# Patient Record
Sex: FEMALE | Race: WHITE | Employment: OTHER | URBAN - METROPOLITAN AREA
[De-identification: names, ages, dates, MRNs, and addresses within clinical notes are randomized per-mention and may not be internally consistent; named-entity substitution may affect disease eponyms.]

---

## 2017-11-24 ENCOUNTER — OFFICE VISIT (OUTPATIENT)
Dept: OBGYN CLINIC | Age: 77
End: 2017-11-24

## 2017-11-24 VITALS
DIASTOLIC BLOOD PRESSURE: 72 MMHG | HEIGHT: 65 IN | BODY MASS INDEX: 37.49 KG/M2 | SYSTOLIC BLOOD PRESSURE: 130 MMHG | WEIGHT: 225 LBS

## 2017-11-24 DIAGNOSIS — Z01.419 ENCOUNTER FOR GYNECOLOGICAL EXAMINATION WITHOUT ABNORMAL FINDING: Primary | ICD-10-CM

## 2017-11-24 RX ORDER — METHYLPREDNISOLONE 4 MG/1
TABLET ORAL
COMMUNITY
Start: 2017-11-21 | End: 2018-09-17 | Stop reason: CLARIF

## 2017-11-24 RX ORDER — APIXABAN 5 MG/1
5 TABLET, FILM COATED ORAL 2 TIMES DAILY
COMMUNITY
Start: 2017-11-07

## 2017-11-24 NOTE — PATIENT INSTRUCTIONS

## 2017-11-24 NOTE — PROGRESS NOTES
Annual exam ages 69+ post hysterectomy    Freddy Burns is a ,  69 y.o. female [facility] whose No LMP recorded. Patient has had a hysterectomy. She presents for her annual checkup. She is having no significant problems. Hormonal status:  She is not having vasomotor symptoms. The patient is using ERT. Sexual history:     She reports that she currently engages in sexual activity and has had male partners. She reports using the following method of birth control/protection: Surgical.    Medical conditions:    Since her last annual GYN exam about two years ago, she has not the following changes in her health history: none. Pap and Mammogram History:    Her most recent Pap smear wasnormal obtained 7 year(s) ago. The patient had her mammogram today in our office. Breast Cancer History/Substance Abuse: There is not  a history of breast cancer in her family. Tobacco History:  reports that she quit smoking about 29 years ago. She has a 30.00 pack-year smoking history. She has never used smokeless tobacco.  Alcohol Abuse:  reports that she does not drink alcohol. Drug Abuse:  reports that she does not use illicit drugs. Osteoporosis History:    Family history does not include a first or second degree relative with osteopenia or osteoporosis. A bone density scan was obtained 5yrs ago and revealed WNL. She is currently taking calcium and vit D. Past Medical History:   Diagnosis Date    Allergic rhinitis due to other allergen     Anxiety and depression     Arrhythmia     \"palpitations\"-onset within past year-ATRIAL FIB STATES PATIENT    Arthritis     all joints, and states she needs left hip replaced    Atrial fibrillation (HCC)     Dysthymic disorder     Dysthymic disorder     Esophageal abrasion 2014    Pt states she had 4 units of blood.     Essential hypertension, benign     H/O: hysterectomy     Hernia     Hx of bone density study 11    wnl  spine (1.7)  hip(1.0)    Hypertension     Mixed hyperlipidemia     Mixed hyperlipidemia     Thyroid disease     hypothyroid    Unspecified hypothyroidism      Past Surgical History:   Procedure Laterality Date    HX APPENDECTOMY      HX BREAST BIOPSY  11/6/2013    LEFT papilloma    HX BREAST BIOPSY Left 05/03/16    Fat necrosis. 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  HX HEENT      tooth implants    HX HERNIA REPAIR  2008    HX HIP REPLACEMENT  2/18/46    complicated by A fib post op    HX HIP REPLACEMENT  01/22/14    left    HX OTHER SURGICAL  07/2014    Heart Surgery for AFIB    HX TOTAL ABDOMINAL HYSTERECTOMY  1976    HX UROLOGICAL  2007    bladder tuck--colposacral susp- u-v suspension    LAP UMBILICAL HERNIA REPAIR  2009    Dr. Pedro Luis Small     Current Outpatient Prescriptions   Medication Sig Dispense Refill    methylPREDNISolone (MEDROL DOSEPACK) 4 mg tablet       ELIQUIS 5 mg tablet       TIKOSYN 250 mcg capsule       lisinopril (PRINIVIL, ZESTRIL) 20 mg tablet       zolpidem (AMBIEN) 5 mg tablet       estradiol (CLIMARA) 0.05 mg/24 hr 1 Patch by TransDERmal route Every Saturday. 12 Patch 4    sertraline (ZOLOFT) 50 mg tablet       traMADol (ULTRAM) 50 mg tablet Take 1 Tab by mouth every eight (8) hours as needed for Pain. 30 Tab 0    levothyroxine (SYNTHROID) 100 mcg tablet Take 1 Tab by mouth Daily (before breakfast). 90 Tab 3    ergocalciferol (VITAMIN D2) 50,000 unit capsule Take 1 Cap by mouth two (2) times a week. 9 Cap 5    LORazepam (ATIVAN) 0.5 mg tablet Take 1 Tab by mouth two (2) times daily as needed for Anxiety. 180 Tab 1    buPROPion XL (WELLBUTRIN XL) 300 mg XL tablet Take 1 Tab by mouth every morning. 90 Tab 3    azelastine (ASTELIN) 137 mcg nasal spray 1 [place] by Both Nostrils route two (2) times a day. Administer to right and left nostril. 3 Bottle 2    metoprolol succinate (TOPROL-XL) 25 mg XL tablet Take 2 Tabs by mouth daily.  90 Tab 3    omeprazole (PRILOSEC) 20 mg capsule       diltiazem hcl (CARDIZEM) 120 mg tablet Take 120 mg by mouth once. Allergies: Review of patient's allergies indicates no known allergies. Social History     Social History    Marital status:      Spouse name: N/A    Number of children: N/A    Years of education: N/A     Occupational History    Not on file.      Social History Main Topics    Smoking status: Former Smoker     Packs/day: 1.00     Years: 30.00     Quit date: 11/4/1988    Smokeless tobacco: Never Used    Alcohol use No    Drug use: No    Sexual activity: Yes     Partners: Male     Birth control/ protection: Surgical      Comment: Hysterectomy     Other Topics Concern    Not on file     Social History Narrative     Patient Active Problem List   Diagnosis Code    Essential hypertension, benign I10    Allergic rhinitis due to other allergen J30.89    Dysthymic disorder F34.1    Unspecified hypothyroidism E03.9    Osteoarthrosis, unspecified whether generalized or localized, unspecified site M19.90    Mixed hyperlipidemia E78.2    Restless legs syndrome (RLS) G25.81    DJD (degenerative joint disease) of hip M16.9    Atrial fibrillation (HCC) I48.91    Urinary incontinence in female R32    Symptomatic menopausal or female climacteric states N95.1       Review of Systems - History obtained from the patient  Constitutional: negative for weight loss, fever, night sweats  HEENT: negative for hearing loss, earache, congestion, snoring, sorethroat  CV: negative for chest pain, palpitations, edema  Resp: negative for cough, shortness of breath, wheezing  GI: negative for change in bowel habits, abdominal pain, black or bloody stools  : negative for frequency, dysuria, hematuria, vaginal discharge  MSK: negative for back pain, joint pain, muscle pain  Breast: negative for breast lumps, nipple discharge, galactorrhea  Skin :negative for itching, rash, hives  Neuro: negative for dizziness, headache, confusion, weakness  Psych: negative for anxiety, depression, change in mood  Heme/lymph: negative for bleeding, bruising, pallor    Physical Exam    Visit Vitals    /72    Ht 5' 5\" (1.651 m)    Wt 225 lb (102.1 kg)    BMI 37.44 kg/m2     Constitutional  · Appearance: well-nourished, well developed, alert, in no acute distress    HENT  · Head and Face: appears normal    Neck  · Inspection/Palpation: normal appearance, no masses or tenderness  · Lymph Nodes: no lymphadenopathy present  · Thyroid: gland size normal, nontender, no nodules or masses present on palpation    Chest  · Respiratory Effort: breathing labored  · Auscultation: normal breath sounds    Cardiovascular  · Heart:  · Auscultation: regular rate and rhythm without murmur    Breasts  · Inspection of Breasts: breasts symmetrical, no skin changes, no discharge present, nipple appearance normal, no skin retraction present  · Palpation of Breasts and Axillae: no masses present on palpation, no breast tenderness  · Axillary Lymph Nodes: no lymphadenopathy present    Gastrointestinal  · Abdominal Examination: abdomen non-tender to palpation, normal bowel sounds, no masses present  · Liver and spleen: no hepatomegaly present, spleen not palpable  · Hernias: no hernias identified    Skin  · General Inspection: no rash, no lesions identified    Neurologic/Psychiatric  · Mental Status:  · Orientation: grossly oriented to person, place and time  · Mood and Affect: mood normal, affect appropriate    Genitourinary  · External Genitalia: normal appearance for age, no discharge present, no tenderness present, no inflammatory lesions present, no masses present, atrophy present  · Vagina: atrophic but otherwise normal vaginal vault without central or paravaginal defects, no discharge present, no inflammatory lesions present, no masses present  · Bladder: non-tender to palpation  · Urethra: appears normal  · Cervix: absent   · Uterus: absent  · Adnexa: no adnexal tenderness present, no adnexal masses present  · Perineum: perineum within normal limits, no evidence of trauma, no rashes or skin lesions present  · Anus: anus within normal limits, no hemorrhoids present  · Inguinal Lymph Nodes: no lymphadenopathy present    Assessment:  Routine gynecologic examination  Her current medical status is satisfactory with no evidence of significant gynecologic issues.     Plan:  Counseled re: diet, exercise, healthy lifestyle  Return for yearly wellness visits  Rec annual mammogram

## 2017-11-29 ENCOUNTER — TELEPHONE (OUTPATIENT)
Dept: OBGYN CLINIC | Age: 77
End: 2017-11-29

## 2017-11-29 NOTE — TELEPHONE ENCOUNTER
Patient is calling to see if she can get mammography results from Dr. Boy Stone that was performed on 11/24/17. \    Left message for patient to call me with     (Results are normal, repeat in 1 year).

## 2018-09-18 ENCOUNTER — HOSPITAL ENCOUNTER (OUTPATIENT)
Age: 78
Setting detail: OUTPATIENT SURGERY
Discharge: HOME | End: 2018-09-18
Attending: SPECIALIST | Admitting: SPECIALIST
Payer: MEDICARE

## 2018-09-18 ENCOUNTER — ANESTHESIA EVENT (OUTPATIENT)
Dept: ENDOSCOPY | Age: 78
End: 2018-09-18
Payer: MEDICARE

## 2018-09-18 ENCOUNTER — ANESTHESIA (OUTPATIENT)
Dept: ENDOSCOPY | Age: 78
End: 2018-09-18
Payer: MEDICARE

## 2018-09-18 VITALS
TEMPERATURE: 97.9 F | BODY MASS INDEX: 37.49 KG/M2 | RESPIRATION RATE: 19 BRPM | HEIGHT: 65 IN | SYSTOLIC BLOOD PRESSURE: 126 MMHG | HEART RATE: 50 BPM | WEIGHT: 225 LBS | DIASTOLIC BLOOD PRESSURE: 62 MMHG | OXYGEN SATURATION: 98 %

## 2018-09-18 PROCEDURE — 76060000031 HC ANESTHESIA FIRST 0.5 HR: Performed by: SPECIALIST

## 2018-09-18 PROCEDURE — 74011250636 HC RX REV CODE- 250/636

## 2018-09-18 PROCEDURE — 74011250636 HC RX REV CODE- 250/636: Performed by: PHYSICIAN ASSISTANT

## 2018-09-18 PROCEDURE — 76040000019: Performed by: SPECIALIST

## 2018-09-18 RX ORDER — NALOXONE HYDROCHLORIDE 0.4 MG/ML
0.4 INJECTION, SOLUTION INTRAMUSCULAR; INTRAVENOUS; SUBCUTANEOUS
Status: DISCONTINUED | OUTPATIENT
Start: 2018-09-18 | End: 2018-09-18 | Stop reason: HOSPADM

## 2018-09-18 RX ORDER — LIDOCAINE HYDROCHLORIDE 20 MG/ML
INJECTION, SOLUTION EPIDURAL; INFILTRATION; INTRACAUDAL; PERINEURAL AS NEEDED
Status: DISCONTINUED | OUTPATIENT
Start: 2018-09-18 | End: 2018-09-18 | Stop reason: HOSPADM

## 2018-09-18 RX ORDER — LOSARTAN POTASSIUM 100 MG/1
100 TABLET ORAL DAILY
COMMUNITY

## 2018-09-18 RX ORDER — PROPOFOL 10 MG/ML
INJECTION, EMULSION INTRAVENOUS AS NEEDED
Status: DISCONTINUED | OUTPATIENT
Start: 2018-09-18 | End: 2018-09-18 | Stop reason: HOSPADM

## 2018-09-18 RX ORDER — FENTANYL CITRATE 50 UG/ML
25 INJECTION, SOLUTION INTRAMUSCULAR; INTRAVENOUS AS NEEDED
Status: DISCONTINUED | OUTPATIENT
Start: 2018-09-18 | End: 2018-09-18 | Stop reason: HOSPADM

## 2018-09-18 RX ORDER — PANTOPRAZOLE SODIUM 20 MG/1
20 TABLET, DELAYED RELEASE ORAL
Qty: 60 TAB | Refills: 1 | Status: ON HOLD | OUTPATIENT
Start: 2018-09-18 | End: 2018-11-28

## 2018-09-18 RX ORDER — DEXTROMETHORPHAN/PSEUDOEPHED 2.5-7.5/.8
1.2 DROPS ORAL
Status: DISCONTINUED | OUTPATIENT
Start: 2018-09-18 | End: 2018-09-18 | Stop reason: HOSPADM

## 2018-09-18 RX ORDER — SODIUM CHLORIDE 9 MG/ML
50 INJECTION, SOLUTION INTRAVENOUS CONTINUOUS
Status: DISCONTINUED | OUTPATIENT
Start: 2018-09-18 | End: 2018-09-18 | Stop reason: HOSPADM

## 2018-09-18 RX ORDER — FLUMAZENIL 0.1 MG/ML
0.2 INJECTION INTRAVENOUS
Status: DISCONTINUED | OUTPATIENT
Start: 2018-09-18 | End: 2018-09-18 | Stop reason: HOSPADM

## 2018-09-18 RX ORDER — EPINEPHRINE 0.1 MG/ML
1 INJECTION INTRACARDIAC; INTRAVENOUS
Status: DISCONTINUED | OUTPATIENT
Start: 2018-09-18 | End: 2018-09-18 | Stop reason: HOSPADM

## 2018-09-18 RX ORDER — MIDAZOLAM HYDROCHLORIDE 1 MG/ML
.25-5 INJECTION, SOLUTION INTRAMUSCULAR; INTRAVENOUS AS NEEDED
Status: DISCONTINUED | OUTPATIENT
Start: 2018-09-18 | End: 2018-09-18 | Stop reason: HOSPADM

## 2018-09-18 RX ORDER — ATROPINE SULFATE 0.1 MG/ML
0.5 INJECTION INTRAVENOUS
Status: DISCONTINUED | OUTPATIENT
Start: 2018-09-18 | End: 2018-09-18 | Stop reason: HOSPADM

## 2018-09-18 RX ADMIN — PROPOFOL 20 MG: 10 INJECTION, EMULSION INTRAVENOUS at 07:17

## 2018-09-18 RX ADMIN — SODIUM CHLORIDE 50 ML/HR: 900 INJECTION, SOLUTION INTRAVENOUS at 06:36

## 2018-09-18 RX ADMIN — LIDOCAINE HYDROCHLORIDE 80 MG: 20 INJECTION, SOLUTION EPIDURAL; INFILTRATION; INTRACAUDAL; PERINEURAL at 07:16

## 2018-09-18 RX ADMIN — PROPOFOL 20 MG: 10 INJECTION, EMULSION INTRAVENOUS at 07:18

## 2018-09-18 RX ADMIN — PROPOFOL 60 MG: 10 INJECTION, EMULSION INTRAVENOUS at 07:16

## 2018-09-18 NOTE — ANESTHESIA POSTPROCEDURE EVALUATION
Post-Anesthesia Evaluation and Assessment Patient: Blossom Beltrán MRN: [id number]  SSN: [id number] YOB: 1940  Age: 69 y.o. Sex: female Cardiovascular Function/Vital Signs Visit Vitals  /59  Pulse (!) 54  Temp 36.6 °C (97.9 °F)  Resp 13  Ht 5' 5\" (1.651 m)  Wt 102.1 kg (225 lb)  SpO2 96%  Breastfeeding No  
 BMI 37.44 kg/m2 Patient is status post MAC anesthesia for Procedure(s): ESOPHAGOGASTRODUODENOSCOPY (EGD). Nausea/Vomiting: None Postoperative hydration reviewed and adequate. Pain: 
Pain Scale 1: Numeric (0 - 10) (09/18/18 9746) Pain Intensity 1: 0 (09/18/18 8745) Managed Neurological Status: At baseline Mental Status and Level of Consciousness: Arousable Pulmonary Status:  
O2 Device: Room air (09/18/18 1563) Adequate oxygenation and airway patent Complications related to anesthesia: None Post-anesthesia assessment completed. No concerns Signed By: Raisa Stephens MD   
 September 18, 2018

## 2018-09-18 NOTE — ANESTHESIA PREPROCEDURE EVALUATION
Anesthetic History No history of anesthetic complications Review of Systems / Medical History Patient summary reviewed, nursing notes reviewed and pertinent labs reviewed Pulmonary Within defined limits Neuro/Psych Within defined limits Cardiovascular Hypertension Dysrhythmias : atrial fibrillation Exercise tolerance: >4 METS Comments: metoprolol succinate (TOPROL-XL) 25 mg XL tablet 9/18/2018 at 0400 GI/Hepatic/Renal 
Within defined limits Endo/Other Hypothyroidism Arthritis Other Findings Physical Exam 
 
Airway Mallampati: II 
TM Distance: 4 - 6 cm Neck ROM: normal range of motion Mouth opening: Normal 
 
 Cardiovascular Regular rate and rhythm,  S1 and S2 normal,  no murmur, click, rub, or gallop Rhythm: regular Rate: normal 
 
 
 
 Dental 
 
Dentition: Implants Pulmonary Breath sounds clear to auscultation Abdominal 
GI exam deferred Other Findings Anesthetic Plan ASA: 3 Anesthesia type: MAC Anesthetic plan and risks discussed with: Patient

## 2018-09-18 NOTE — PROCEDURES
[address]  [phone]      2018    Esophagogastroduodenoscopy (EGD) Procedure Note  Sandee Heath  : 1940  [employer] Medical Record Number: [id number]      Indications:    History of peptic ulcer to assess for healing. Referring Physician:  Emmanuel Morales MD  Anesthesia/Sedation:    Endoscopist:  Dr. Daniel Hill  Complications:  None  Estimated Blood Loss:  None    Permit:  The indications, risks, benefits and alternatives were reviewed with the patient or their decision maker who was provided an opportunity to ask questions and all questions were answered. The specific risks of esophagogastroduodenoscopy with conscious sedation were reviewed, including but not limited to anesthetic complication, bleeding, adverse drug reaction, missed lesion, infection, IV site reactions, and intestinal perforation which would lead to the need for surgical repair. Alternatives to EGD including radiographic imaging, observation without testing, or laboratory testing were reviewed as well as the limitations of those alternatives discussed. After considering the options and having all their questions answered, the patient or their decision maker provided both verbal and written consent to proceed. Procedure in Detail:  After obtaining informed consent, positioning of the patient in the left lateral decubitus position, and conduction of a pre-procedure pause or \"time out\" the endoscope was introduced into the mouth and advanced to the duodenum. A careful inspection was made, and findings or interventions are described below. Findings:   Esophagus:normal  Stomach: Two small excavated ulcers of the antrum - non bleeding. Duodenum/jejunum: normal      Specimens: none -- patient anticoagulated. Impression: PUD.            Recommendations:  -Acid suppression with a proton pump inhibitor. , -No NSAIDS, -H pylori test will be obtained and eradicated if positive  Repeat EGD in 8 weeks to assess for healing (off eliquis). Thank you for entrusting me with this patient's care. Please do not hesitate to contact me with any questions or if I can be of assistance with any of your other patients' GI needs.   Signed By: Sabrina Andrews MD                        September 18, 2018     Surgical assistant none

## 2018-09-18 NOTE — INTERVAL H&P NOTE
Pre-Endoscopy H&P Update Chief complaint/HPI/ROS:  The indication for the procedure, the patient's history and the patient's current medications are reviewed prior to the procedure and that data is reported on the H&P to which this document is attached. Any significant complaints with regard to organ systems will be noted, and if not mentioned then a review of systems is not contributory. Past Medical History:  
Diagnosis Date  Allergic rhinitis due to other allergen  Anxiety and depression  Arrhythmia \"palpitations\"-onset within past year-ATRIAL FIB STATES PATIENT  Arthritis   
 all joints, and states she needs left hip replaced  Atrial fibrillation ([facility] [address]  Dysthymic disorder  Dysthymic disorder  Esophageal abrasion 04/2014 Pt states she had 4 units of blood.  Essential hypertension, benign  H/O: hysterectomy  Hernia  Hx of bone density study 04/29/11  
 wnl  spine (1.7)  hip(1.0)  Hypertension  Mixed hyperlipidemia  Mixed hyperlipidemia  Thyroid disease   
 hypothyroid  Unspecified hypothyroidism Past Surgical History:  
Procedure Laterality Date  CARDIAC SURG PROCEDURE UNLIST  2014  
 ablation / Dr. Den Farfan  HX APPENDECTOMY  HX BREAST BIOPSY  11/6/2013 LEFT papilloma  HX BREAST BIOPSY Left 05/03/16 Fat necrosis. 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  HX HEENT    
 tooth implants  HX HERNIA REPAIR  3386  
 umbilical hernia Dr Osmin Vaughan  HX HIP REPLACEMENT  1/15/33  
 complicated by A fib post op  HX HIP REPLACEMENT  01/22/14  
 left  HX HYSTERECTOMY  HX ORTHOPAEDIC  2014  
 left hip replacement  HX OTHER SURGICAL  07/2014 Heart Surgery for AFIB [facility]  HX UROLOGICAL  2007  
 bladder tuck--colposacral susp- u-v suspension  LAP UMBILICAL HERNIA REPAIR  2009 Dr. Wally Reyes Social  
Social History Substance Use Topics  Smoking status: Former Smoker Packs/day: 1.00 Years: 30.00 Quit date: 1988  Smokeless tobacco: Never Used  Alcohol use No  
  
Family History Problem Relation Age of Onset  Hypertension Mother  Heart Disease Mother  Hypertension Brother  Cancer Father   
  prostate  Cancer Sister   
  breast  
 Cancer Maternal Aunt   
  breast  
 Anxiety Other  Allergic Rhinitis Other  Arthritis-osteo Other  Depression Other  Headache Other  Thyroid Disease Other  Cancer Other 52  
  breast  
 Cancer Other   
  breast  
 Cancer Other   
  breast/breast  
 Anesth Problems Neg Hx No Known Allergies Prior to Admission Medications Prescriptions Last Dose Informant Patient Reported? Taking? ELIQUIS 5 mg tablet 2018 at 0400  Yes Yes LORazepam (ATIVAN) 0.5 mg tablet 2018 at pm  No Yes Sig: Take 1 Tab by mouth two (2) times daily as needed for Anxiety. TIKOSYN 250 mcg capsule 2018 at 0400  Yes Yes  
buPROPion XL (WELLBUTRIN XL) 300 mg XL tablet 2018 at 0400  No Yes Sig: Take 1 Tab by mouth every morning. estradiol (CLIMARA) 0.05 mg/24 hr 9/15/2018 at am  No No  
Si Patch by TransDERmal route Every Saturday. levothyroxine (SYNTHROID) 100 mcg tablet 2018 at 0400  No Yes Sig: Take 1 Tab by mouth Daily (before breakfast). losartan (COZAAR) 100 mg tablet 2018 at 0400  Yes Yes Sig: Take 100 mg by mouth daily. metoprolol succinate (TOPROL-XL) 25 mg XL tablet 2018 at 0400  No Yes Sig: Take 2 Tabs by mouth daily. traMADol (ULTRAM) 50 mg tablet 2018 at pm  No No  
Sig: Take 1 Tab by mouth every eight (8) hours as needed for Pain. Facility-Administered Medications: None PHYSICAL EXAM:  The patient is examined immediately prior to the procedure. Visit Vitals  /64  Pulse (!) 51  Temp 97.9 °F (36.6 °C)  Resp 13  Ht 5' 5\" (1.651 m)  Wt 102.1 kg (225 lb)  SpO2 97%  Breastfeeding No  
 BMI 37.44 kg/m2 Gen: Appears comfortable, no distress. Pulm: comfortable respirations with no abnormal audible breath sounds HEART: well perfused, no abnormal audible heart sounds GI: abdomen flat. PLAN:  Informed consent discussion held, patient afforded an opportunity to ask questions and all questions answered. After being advised of the risks, benefits, and alternatives, the patient requested that we proceed and indicated so on a written consent form. Will proceed with procedure as planned.  
Dean Trivedi MD

## 2018-09-18 NOTE — IP AVS SNAPSHOT
Shilpi Jeffery 
 
 
 [address] [place], [place] [id number] Patient: Lucio Le MRN: [id number] About your hospitalization You were admitted on:  September 18, 2018 You last received care in the:  [facility] ENDOSCOPY You were discharged on:  September 18, 2018 Why you were hospitalized Your primary diagnosis was:  Not on File Follow-up Information None Discharge Orders None A check rbanden indicates which time of day the medication should be taken. My Medications START taking these medications Instructions Each Dose to Equal  
 Morning Noon Evening Bedtime  
 pantoprazole 20 mg tablet Commonly known as:  PROTONIX Your last dose was: Your next dose is: Take 1 Tab by mouth Daily (before breakfast). 20 mg CONTINUE taking these medications Instructions Each Dose to Equal  
 Morning Noon Evening Bedtime buPROPion  mg XL tablet Commonly known as:  Moy Merlos Your last dose was: Your next dose is: Take 1 Tab by mouth every morning. 300 mg  
    
   
   
   
  
 ELIQUIS 5 mg tablet Generic drug:  apixaban Your last dose was: Your next dose is:    
   
   
      
   
   
   
  
 estradiol 0.05 mg/24 hr  
Commonly known as:  Christie Westfield Your last dose was: Your next dose is:    
   
   
 1 Patch by TransDERmal route Every Saturday. 1 Patch  
    
   
   
   
  
 levothyroxine 100 mcg tablet Commonly known as:  SYNTHROID Your last dose was: Your next dose is: Take 1 Tab by mouth Daily (before breakfast). 100 mcg LORazepam 0.5 mg tablet Commonly known as:  ATIVAN Your last dose was: Your next dose is: Take 1 Tab by mouth two (2) times daily as needed for Anxiety.   
 0.5 mg  
    
   
   
 losartan 100 mg tablet Commonly known as:  COZAAR Your last dose was: Your next dose is: Take 100 mg by mouth daily. 100 mg  
    
   
   
   
  
 metoprolol succinate 25 mg XL tablet Commonly known as:  TOPROL-XL Your last dose was: Your next dose is: Take 2 Tabs by mouth daily. 50 mg  
    
   
   
   
  
 TIKOSYN 250 mcg capsule Generic drug:  dofetilide Your last dose was: Your next dose is:    
   
   
      
   
   
   
  
 traMADol 50 mg tablet Commonly known as:  ULTRAM  
   
Your last dose was: Your next dose is: Take 1 Tab by mouth every eight (8) hours as needed for Pain. 50 mg Where to Get Your Medications Information on where to get these meds will be given to you by the nurse or doctor. ! Ask your nurse or doctor about these medications  
  pantoprazole 20 mg tablet Opioid Education Prescription Opioids: What You Need to Know: 
 
Prescription opioids can be used to help relieve moderate-to-severe pain and are often prescribed following a surgery or injury, or for certain health conditions. These medications can be an important part of treatment but also come with serious risks. Opioids are strong pain medicines. Examples include hydrocodone, oxycodone, fentanyl, and morphine. Heroin is an example of an illegal opioid. It is important to work with your health care provider to make sure you are getting the safest, most effective care. WHAT ARE THE RISKS AND SIDE EFFECTS OF OPIOID USE? Prescription opioids carry serious risks of addiction and overdose, especially with prolonged use. An opioid overdose, often marked by slow breathing, can cause sudden death. The use of prescription opioids can have a number of side effects as well, even when taken as directed. · Tolerance-meaning you might need to take more of a medication for the same pain relief · Physical dependence-meaning you have symptoms of withdrawal when the medication is stopped. Withdrawal symptoms can include nausea, sweating, chills, diarrhea, stomach cramps, and muscle aches. Withdrawal can last up to several weeks, depending on which drug you took and how long you took it. · Increased sensitivity to pain · Constipation · Nausea, vomiting, and dry mouth · Sleepiness and dizziness · Confusion · Depression · Low levels of testosterone that can result in lower sex drive, energy, and strength · Itching and sweating RISKS ARE GREATER WITH:      
· History of drug misuse, substance use disorder, or overdose · Mental health conditions (such as depression or anxiety) · Sleep apnea · Older age (72 years or older) · Pregnancy Avoid alcohol while taking prescription opioids. Also, unless specifically advised by your health care provider, medications to avoid include: · Benzodiazepines (such as Xanax or Valium) · Muscle relaxants (such as Soma or Flexeril) · Hypnotics (such as Ambien or Lunesta) · Other prescription opioids KNOW YOUR OPTIONS Talk to your health care provider about ways to manage your pain that don't involve prescription opioids. Some of these options may actually work better and have fewer risks and side effects. Options may include: 
· Pain relievers such as acetaminophen, ibuprofen, and naproxen · Some medications that are also used for depression or seizures · Physical therapy and exercise · Counseling to help patients learn how to cope better with triggers of pain and stress. · Application of heat or cold compress · Massage therapy · Relaxation techniques Be Informed Make sure you know the name of your medication, how much and how often to take it, and its potential risks & side effects.  
 
IF YOU ARE PRESCRIBED OPIOIDS FOR PAIN: 
 · Never take opioids in greater amounts or more often than prescribed. Remember the goal is not to be pain-free but to manage your pain at a tolerable level. · Follow up with your primary care provider to: · Work together to create a plan on how to manage your pain. · Talk about ways to help manage your pain that don't involve prescription opioids. · Talk about any and all concerns and side effects. · Help prevent misuse and abuse. · Never sell or share prescription opioids · Help prevent misuse and abuse. · Store prescription opioids in a secure place and out of reach of others (this may include visitors, children, friends, and family). · Safely dispose of unused/unwanted prescription opioids: Find your community drug take-back program or your pharmacy mail-back program, or flush them down the toilet, following guidance from the Food and Drug Administration (www.fda.gov/Drugs/ResourcesForYou). · Visit www.cdc.gov/drugoverdose to learn about the risks of opioid abuse and overdose. · If you believe you may be struggling with addiction, tell your health care provider and ask for guidance or call [address] [place] [address] at [phone]. Discharge Instructions [address] [facility] Berry Hernandez, [address] [place] [address] 
[phone] September 18, 2018 Guillermo Graves YOB: 1940 ENDOSCOPY DISCHARGE INSTRUCTIONS If there is redness at IV site you should apply warm compress to area. If redness or soreness persist contact Dr. Berry Hernandez' or your primary care doctor. Gaseous discomfort may develop, but walking, belching will help relieve this. You may not operate a vehicle for 12 hours You may not operate machinery or dangerous appliances for rest of today You may not drink alcoholic beverages for 12 hours Avoid making any critical decisions for 24 hours DIET: 
You may resume your normal diet, but some patients find that heavy or large meals may lead to indigestion or vomiting. I suggest a light meal as first food intake. MEDICATIONS: 
The use of some over-the-counter pain medication may lead to bleeding after biopsies or other procedures you may have had done. Tylenol (also called acetaminophen) is safe to take and will not lead to bleeding. Based on the procedure you had today you may safely take aspirin or aspirin-like products for the next ten (10) days. ACTIVITY: 
You may resume your normal household activities, but it is recommended that you spend the remainder of the day resting -  avoid any strenuous activity. CALL DR. Carlos Canas' OFFICE IF: Increasing pain, nausea, vomiting Abdominal distension (swelling) Significant new or increased bleeding (oral or rectal) Fever/Chills Chest pain/shortness of breath Additional instructions:  
Resume all usual medications, but you have an ongoing stomach ulcer for which you must take an ulcer medication for 2 months. I will arrange a breath test to look for the ulcer-causing bacteria, and we will need to re-examine the stomach in 2 months to assess for healing of the ulcer. It was an honor to be your doctor today. Please let me or my office staff know if you have any feedback about today's procedure. Ashok Dela Cruz MD 
 
Pantoprazole (By mouth) Pantoprazole (pan-TOE-pra-zole) Treats gastroesophageal reflux disease (GERD), a damaged esophagus, and high levels of stomach acid. This medicine is a proton pump inhibitor (PPI). Brand Name(s): Protonix There may be other brand names for this medicine. When This Medicine Should Not Be Used: This medicine is not right for everyone. Do not use it if you had an allergic reaction to pantoprazole or similar medicines. How to Use This Medicine: Packet, Tablet, Delayed Release Tablet, Long Acting Tablet · Your doctor will tell you how much medicine to use. Do not use more than directed. Take the medicine at least 30 minutes before a meal. 
· Delayed-release tablet: Swallow the tablet whole. Do not crush, break, or chew it. · Delayed-release packet: ¨ To prepare with applesauce: § Mix the packet contents with 1 teaspoon of applesauce. Do not mix with water, or other liquids or food. Do not divide the packet contents to make smaller doses. § Swallow the mixture within 10 minutes after you mix it. Do not chew or crush the granules. § Sip some water after you take the mixture to make sure you swallow all of the medicine. ¨ To prepare with apple juice: § Mix the packet contents with 1 teaspoon of apple juice in a small cup. Do not divide the packet contents to make smaller doses. § Stir for 5 seconds and drink the mixture immediately. Do not chew or crush the granules. § To make sure you get all of the medicine, add more apple juice to the cup. Drink it immediately. ¨ To prepare for a feeding tube: § Pour the packet contents in a 2-ounce (60 milliliter [mL]) catheter-tip syringe. § Add 10 mL of apple juice to the syringe. Add the mixture to the tube. Gently tap or shake the barrel of the syringe to help empty it. § Add 10 mL of apple juice to the syringe and put it in the tube. Do this at least 2 times. There should be no granules left in the syringe. · This medicine should come with a Medication Guide. Ask your pharmacist for a copy if you do not have one. · Missed dose: Take a dose as soon as you remember. If it is almost time for your next dose, wait until then and take a regular dose. Do not take extra medicine to make up for a missed dose. · Store the medicine in a closed container at room temperature, away from heat, moisture, and direct light. Drugs and Foods to Avoid: Ask your doctor or pharmacist before using any other medicine, including over-the-counter medicines, vitamins, and herbal products. · Some foods and medicines can affect how pantoprazole works. Tell your doctor if you are using any of the following: ¨ Ampicillin, atazanavir, digoxin, erlotinib, ketoconazole, methotrexate, mycophenolate mofetil, nelfinavir ¨ Blood thinner (including warfarin) ¨ Diuretic (water pill) ¨ Iron supplements Warnings While Using This Medicine: · Tell your doctor if you are pregnant or breastfeeding, or if you have liver disease, lupus, or osteoporosis. · This medicine may cause the following problems: ¨ Kidney problems ¨ Low vitamin B12 or magnesium levels ¨ Increased risk of broken bones in the hip, wrist, or spine · This medicine can cause diarrhea. Call your doctor if the diarrhea becomes severe, does not stop, or is bloody. Do not take any medicine to stop diarrhea until you have talked to your doctor. Diarrhea can occur 2 months or more after you stop taking this medicine. · Tell any doctor or dentist who treats you that you are using this medicine. This medicine may affect certain medical test results. · Your doctor will check your progress and the effects of this medicine at regular visits. Keep all appointments. · Keep all medicine out of the reach of children. Never share your medicine with anyone. Possible Side Effects While Using This Medicine:  
Call your doctor right away if you notice any of these side effects: · Allergic reaction: Itching or hives, swelling in your face or hands, swelling or tingling in your mouth or throat, chest tightness, trouble breathing · Blistering, peeling, red skin rash · Fever, joint pain, swelling in your body, unusual weight gain, change in how much or how often you urinate · Joint pain, rash on your cheeks or arms that gets worse in the sun · Seizures, dizziness, uneven heartbeat, muscle cramps or twitching · Severe diarrhea, stomach cramps, fever · Stomach pain, nausea, vomiting, weight loss If you notice other side effects that you think are caused by this medicine, tell your doctor. Call your doctor for medical advice about side effects. You may report side effects to FDA at 4-513-IHE-4182 © 2017 [address] Information is for End User's use only and may not be sold, redistributed or otherwise used for commercial purposes. The above information is an  only. It is not intended as medical advice for individual conditions or treatments. Talk to your doctor, nurse or pharmacist before following any medical regimen to see if it is safe and effective for you. Introducing Bryce Dhillon As a [facility] patient, I wanted to make you aware of our electronic visit tool called Bryce Dhillon. [employer] allows you to connect within minutes with a medical provider 24 hours a day, seven days a week via a mobile device or tablet or logging into a secure website from your computer. You can access Bryce Dhillon from anywhere in the [place]. A virtual visit might be right for you when you have a simple condition and feel like you just dont want to get out of bed, or cant get away from work for an appointment, when your regular [facility] provider is not available (evenings, weekends or holidays), or when youre out of town and need minor care. Electronic visits cost only $49 and if the [employer] provider determines a prescription is needed to treat your condition, one can be electronically transmitted to a nearby pharmacy*. Please take a moment to enroll today if you have not already done so. The enrollment process is free and takes just a few minutes. To enroll, please download the [employer] judah to your tablet or phone, or visit [email or web address] to enroll on your computer. And, as an [address] [place] [address] patient with a [employer] account, the results of your visits will be scanned into your electronic medical record and your primary care provider will be able to view the scanned results. We urge you to continue to see your regular [facility] provider for your ongoing medical care. And while your primary care provider may not be the one available when you seek a Bryce Rickfin virtual visit, the peace of mind you get from getting a real diagnosis real time can be priceless. For more information on Bryce Rickfin, view our Frequently Asked Questions (FAQs) at [email or web address]. Sincerely, 
 
Geovanni Novak MD 
Chief Medical Officer Sunny Ashely Olivares *:  certain medications cannot be prescribed via Bryce Prabhjotfin Providers Seen During Your Hospitalization Provider Specialty Primary office phone Caitlin Garcia MD Gastroenterology [phone] Your Primary Care Physician (PCP) Primary Care Physician Office Phone Office Fax VELIZ, 38 [facility] [phone] You are allergic to the following No active allergies Recent Documentation Height Weight Breastfeeding? BMI OB Status Smoking Status 1.651 m 102.1 kg No 37.44 kg/m2 Hysterectomy Former Smoker Emergency Contacts Name Discharge Info Relation Home Work Mobile Davi Zuniga DISCHARGE CAREGIVER [3] Spouse [3] [phone] Patient Belongings The following personal items are in your possession at time of discharge: 
  Dental Appliances: None  Visual Aid: Glasses, With patient Please provide this summary of care documentation to your next provider. Signatures-by signing, you are acknowledging that this After Visit Summary has been reviewed with you and you have received a copy.   
  
 
  
    
    
 Patient Signature: ____________________________________________________________ Date:  ____________________________________________________________  
  
Paulene Greener Provider Signature:  ____________________________________________________________ Date:  ____________________________________________________________

## 2018-09-18 NOTE — H&P
Date: 2018 2:00 PM  
Patient Name: Ponce Anglin Account #: [id number] Gender: Female  (age): 1940 (69) Provider:    
London Tellez MD  
  
 
Referring Physician:    
Jeramie Brown [address] [place] [address] Rd, Neil Springer, 1 [facility] 
[phone] (phone) 
[phone] (fax) Chief Complaint: I want an endoscopy History of Present Illness:  
69-year-old female with a history of upper GI bleeding presents to the office today requesting follow-up endoscopy to ensure healing of all abnormalities. While traveling to [facility] Mr. Robert Wu developed acute hematemesis leading to hospitalization requiring endoscopy and colonoscopy and the final diagnosis was pill esophagitis leading to an ulcer that bled. She asks for follow-up to ensure complete healing. She denies any ongoing blood loss nausea vomiting weight loss fever or chills joint pain or rash. ? 69-year-old female with a history of upper GI bleeding presents to the office today requesting follow-up endoscopy to ensure healing of all abnormalities. While traveling to [facility] Mr. Robert Wu developed acute hematemesis leading to hospitalization requiring endoscopy and colonoscopy and the final diagnosis was pill esophagitis leading to an ulcer that bled. She asks for follow-up to ensure complete healing. She denies any ongoing blood loss nausea vomiting weight loss fever or chills joint pain or rash. ?   
  
Past Medical History Medical Conditions: Arthritis Atrial Fibrilation Atrial Fibrillation High blood pressure Sleep apnea Surgical Procedures: Appendectomy Bladder Lift Hernia Repair Hysterectomy Dx Studies: Abdominal U/S, 2014 Colonoscopy, 2014 Upper GI/ SBFT, 2014 Medications: Ativan 0.5 mg Take by mouth as needed and as directed 
bupropion HCl 300 mg Take 1 by mouth once a day 
diltiazem HCl 120 mg Take 1 capsule by mouth once a day levothyroxine 100 mcg Take 1 by mouth once a day 
omeprazole 20 mg Take 1 capsule by mouth once a day Toprol XL 50 mg Take 1 by mouth once a day Allergies: Patient has no known allergies or drug allergies Immunizations: Flu vaccine, 2017 Pneumococcal conjugate PCV 13, 2016 TB Test, 4/2014 Social History Alcohol: None Tobacco: Former smokerCigarettes, quit 1988. Drugs: None Exercise: Exercise less than 3 times a week. Walking/ Running. Caffeine: Daily. Marital Status:   
  
  
Occupation:   
  
  
  
 
Family History No history of Colon Cancer, Colon Polyps, Esophogeal Cancer, GI Cancers, IBD (Crohn's or UC), Liver disease Sister: Diagnosed with Breast Cancer;   
  
Review of Systems:  
Cardiovascular: Presents suffers from palpitations. Denies chest pain, irregular heart beat, peripheral edema, syncope, Sweats. Constitutional: Denies fatigue, fever, loss of appetite, weight gain, weight loss. ENMT: Denies nose bleeds, sore throat, hearing loss. Endocrine: Denies excessive thirst, heat intolerance. Eyes: Denies loss of vision. Gastrointestinal: Denies abdominal pain, abdominal swelling, change in bowel habits, constipation, diarrhea, Bloating/gas, heartburn, jaundice, nausea, rectal bleeding, stomach cramps, vomiting, dysphagia, rectal pain, Stool incontinence, hematemesis. Genitourinary: Denies dark urine, dysuria, frequent urination, hematuria, incontinence. Hematologic/Lymphatic: Denies easy bruising, prolonged bleeding. Integumentary: Denies itching, rashes, sun sensitivity. Musculoskeletal: Presents suffers from arthritis, joint pain. Denies back pain, gout, muscle weakness, stiffness. Neurological: Denies dizziness, fainting, frequent headaches, memory loss. Psychiatric: Presents suffers from anxiety. Denies depression, difficulty sleeping, hallucinations, nervousness, panic attacks, paranoia. Respiratory: Presents suffers from dyspnea. Denies cough, wheezing. Vital Signs:  
BP 
(mmHg)  Pulse 
(ppm) Rhythm Weight (lbs/oz) Height (ft/in) BMI Resp/min Temp  
135/63 65 Regular 225 /  5 / 6 36.31 12 98.2 (F) Physical Exam:  
Constitutional:   
 
Appearance: No distress, appears comfortable. Communication: Understands/receives spoken information. Skin: Inspection: No rash, no jaundice. Head/face: Inspection: Normacephalic, atraumatic. Eyes:   
 
Conjunctivae/lids: Normal.  
Pupils/irises: Pupils equal, round and normal.  
ENMT:   
 
External: Normal.  
Hearing: Normal.  
Neck:   
 
Neck: Normal appearance, trachea midline. Jugular veins: No JVD noted. Respiratory:   
 
Effort: Normal respiratory effort, comfortable, speaks in complete sentences. Musculoskeletal:   
 
Gait/station: normal.  
Digits/nails: Normal, no spooning of nails, clubbing, or splinter hemorrhages, no clubbing, cyanosis, petechiae or other inflammatory conditions. Psychiatric:   
 
Judgment/insight: Normal, normal judgement, normal insight. Orientation: oriented to time, space and person. Lab Results: No Electronic Results Impressions: Hematemesis Esophagitis? ? Hematemesis? ? 
? ? Esophagitis? Assessment: ?   
  
 
Plan: Upper Endoscopy? ? Upper Endoscopy? Risk & Medical Necessity: The patient requires Moderate Severity care for this visit. Diagnosis and management options are Multiple. The amount of data reviewed and/or ordered is Minimal/None. The level of risk is Moderate. Notes:   
  
  
   
Sherren Craven. Dede Moncada MD   
 Electronically signed on 2018 2:35:37 PM by Sherren Craven. MD [facility] [id number],  1940 First Visit, 2018

## 2018-09-18 NOTE — PERIOP NOTES
Patient tolerated procedure without problems. Abdomen soft and patient arousable and voices no complaints Report received from CRNA, see anesthesia note. Patient transported to endoscopy recovery area and verbal bedside report given to Donaldo Oglesby RN.

## 2018-09-18 NOTE — DISCHARGE INSTRUCTIONS
[address] [facility] KIKI Loaiza MD  [phone]      September 18, 2018     Javier Limon  YOB: 1940    ENDOSCOPY DISCHARGE INSTRUCTIONS    If there is redness at IV site you should apply warm compress to area. If redness or soreness persist contact Dr. Ha Loaiza' or your primary care doctor. Gaseous discomfort may develop, but walking, belching will help relieve this. You may not operate a vehicle for 12 hours  You may not operate machinery or dangerous appliances for rest of today  You may not drink alcoholic beverages for 12 hours  Avoid making any critical decisions for 24 hours    DIET:  You may resume your normal diet, but some patients find that heavy or large meals may lead to indigestion or vomiting. I suggest a light meal as first food intake. MEDICATIONS:  The use of some over-the-counter pain medication may lead to bleeding after biopsies or other procedures you may have had done. Tylenol (also called acetaminophen) is safe to take and will not lead to bleeding. Based on the procedure you had today you may safely take aspirin or aspirin-like products for the next ten (10) days. ACTIVITY:  You may resume your normal household activities, but it is recommended that you spend the remainder of the day resting -  avoid any strenuous activity. CALL DR. Glenroy Valverde' OFFICE IF:  Increasing pain, nausea, vomiting  Abdominal distension (swelling)  Significant new or increased bleeding (oral or rectal)  Fever/Chills  Chest pain/shortness of breath                   Additional instructions:   Resume all usual medications, but you have an ongoing stomach ulcer for which you must take an ulcer medication for 2 months. I will arrange a breath test to look for the ulcer-causing bacteria, and we will need to re-examine the stomach in 2 months to assess for healing of the ulcer.      It was an honor to be your doctor today. Please let me or my office staff know if you have any feedback about today's procedure. Ganga Garcia MD    Pantoprazole (By mouth)   Pantoprazole (pan-TOE-pra-zole)  Treats gastroesophageal reflux disease (GERD), a damaged esophagus, and high levels of stomach acid. This medicine is a proton pump inhibitor (PPI). Brand Name(s): Protonix   There may be other brand names for this medicine. When This Medicine Should Not Be Used: This medicine is not right for everyone. Do not use it if you had an allergic reaction to pantoprazole or similar medicines. How to Use This Medicine:   Packet, Tablet, Delayed Release Tablet, Long Acting Tablet  · Your doctor will tell you how much medicine to use. Do not use more than directed. Take the medicine at least 30 minutes before a meal.  · Delayed-release tablet: Swallow the tablet whole. Do not crush, break, or chew it. · Delayed-release packet:   ¨ To prepare with applesauce:   § Mix the packet contents with 1 teaspoon of applesauce. Do not mix with water, or other liquids or food. Do not divide the packet contents to make smaller doses. § Swallow the mixture within 10 minutes after you mix it. Do not chew or crush the granules. § Sip some water after you take the mixture to make sure you swallow all of the medicine. ¨ To prepare with apple juice:   § Mix the packet contents with 1 teaspoon of apple juice in a small cup. Do not divide the packet contents to make smaller doses. § Stir for 5 seconds and drink the mixture immediately. Do not chew or crush the granules. § To make sure you get all of the medicine, add more apple juice to the cup. Drink it immediately. ¨ To prepare for a feeding tube:   § Pour the packet contents in a 2-ounce (60 milliliter [mL]) catheter-tip syringe. § Add 10 mL of apple juice to the syringe. Add the mixture to the tube. Gently tap or shake the barrel of the syringe to help empty it.   § Add 10 mL of apple juice to the syringe and put it in the tube. Do this at least 2 times. There should be no granules left in the syringe. · This medicine should come with a Medication Guide. Ask your pharmacist for a copy if you do not have one. · Missed dose: Take a dose as soon as you remember. If it is almost time for your next dose, wait until then and take a regular dose. Do not take extra medicine to make up for a missed dose. · Store the medicine in a closed container at room temperature, away from heat, moisture, and direct light. Drugs and Foods to Avoid:   Ask your doctor or pharmacist before using any other medicine, including over-the-counter medicines, vitamins, and herbal products. · Some foods and medicines can affect how pantoprazole works. Tell your doctor if you are using any of the following:   ¨ Ampicillin, atazanavir, digoxin, erlotinib, ketoconazole, methotrexate, mycophenolate mofetil, nelfinavir  ¨ Blood thinner (including warfarin)  ¨ Diuretic (water pill)  ¨ Iron supplements  Warnings While Using This Medicine:   · Tell your doctor if you are pregnant or breastfeeding, or if you have liver disease, lupus, or osteoporosis. · This medicine may cause the following problems:  ¨ Kidney problems  ¨ Low vitamin B12 or magnesium levels  ¨ Increased risk of broken bones in the hip, wrist, or spine  · This medicine can cause diarrhea. Call your doctor if the diarrhea becomes severe, does not stop, or is bloody. Do not take any medicine to stop diarrhea until you have talked to your doctor. Diarrhea can occur 2 months or more after you stop taking this medicine. · Tell any doctor or dentist who treats you that you are using this medicine. This medicine may affect certain medical test results. · Your doctor will check your progress and the effects of this medicine at regular visits. Keep all appointments. · Keep all medicine out of the reach of children. Never share your medicine with anyone.   Possible Side Effects While Using This Medicine:   Call your doctor right away if you notice any of these side effects:  · Allergic reaction: Itching or hives, swelling in your face or hands, swelling or tingling in your mouth or throat, chest tightness, trouble breathing  · Blistering, peeling, red skin rash  · Fever, joint pain, swelling in your body, unusual weight gain, change in how much or how often you urinate  · Joint pain, rash on your cheeks or arms that gets worse in the sun  · Seizures, dizziness, uneven heartbeat, muscle cramps or twitching  · Severe diarrhea, stomach cramps, fever  · Stomach pain, nausea, vomiting, weight loss  If you notice other side effects that you think are caused by this medicine, tell your doctor. Call your doctor for medical advice about side effects. You may report side effects to FDA at 6-036-FDA-7407  © 2017 [employer] Information is for End User's use only and may not be sold, redistributed or otherwise used for commercial purposes. The above information is an  only. It is not intended as medical advice for individual conditions or treatments. Talk to your doctor, nurse or pharmacist before following any medical regimen to see if it is safe and effective for you.

## 2018-11-27 NOTE — H&P
69 y.o. female for open access EGD for reevaluation of PUD. Dr. Surekha Barker has Cara Lane his opinion about risk/benefits and we can proceed. I have his letter in my EHR. Additional data for completion of the targeted pre-endoscopy H&P will be provided under 'H&P interval notes'. Please see that document which will be attached to this.  
Omaira Vaz MD

## 2018-11-28 ENCOUNTER — ANESTHESIA (OUTPATIENT)
Dept: ENDOSCOPY | Age: 78
End: 2018-11-28
Payer: MEDICARE

## 2018-11-28 ENCOUNTER — HOSPITAL ENCOUNTER (OUTPATIENT)
Age: 78
Setting detail: OUTPATIENT SURGERY
Discharge: HOME | End: 2018-11-28
Attending: SPECIALIST | Admitting: SPECIALIST
Payer: MEDICARE

## 2018-11-28 ENCOUNTER — ANESTHESIA EVENT (OUTPATIENT)
Dept: ENDOSCOPY | Age: 78
End: 2018-11-28
Payer: MEDICARE

## 2018-11-28 VITALS
TEMPERATURE: 97.6 F | HEIGHT: 65 IN | WEIGHT: 223 LBS | DIASTOLIC BLOOD PRESSURE: 84 MMHG | BODY MASS INDEX: 37.15 KG/M2 | HEART RATE: 96 BPM | SYSTOLIC BLOOD PRESSURE: 151 MMHG | RESPIRATION RATE: 14 BRPM | OXYGEN SATURATION: 98 %

## 2018-11-28 PROCEDURE — 74011250636 HC RX REV CODE- 250/636

## 2018-11-28 PROCEDURE — 77030009426 HC FCPS BIOP ENDOSC BSC -B: Performed by: SPECIALIST

## 2018-11-28 PROCEDURE — 88305 TISSUE EXAM BY PATHOLOGIST: CPT

## 2018-11-28 PROCEDURE — 76040000019: Performed by: SPECIALIST

## 2018-11-28 PROCEDURE — 74011250636 HC RX REV CODE- 250/636: Performed by: PHYSICIAN ASSISTANT

## 2018-11-28 PROCEDURE — 88342 IMHCHEM/IMCYTCHM 1ST ANTB: CPT

## 2018-11-28 PROCEDURE — 76060000031 HC ANESTHESIA FIRST 0.5 HR: Performed by: SPECIALIST

## 2018-11-28 RX ORDER — LISINOPRIL 20 MG/1
TABLET ORAL DAILY
COMMUNITY

## 2018-11-28 RX ORDER — DEXTROMETHORPHAN/PSEUDOEPHED 2.5-7.5/.8
1.2 DROPS ORAL
Status: DISCONTINUED | OUTPATIENT
Start: 2018-11-28 | End: 2018-11-28 | Stop reason: HOSPADM

## 2018-11-28 RX ORDER — SERTRALINE HYDROCHLORIDE 50 MG/1
TABLET, FILM COATED ORAL DAILY
Status: ON HOLD | COMMUNITY
End: 2018-11-28

## 2018-11-28 RX ORDER — SODIUM CHLORIDE 9 MG/ML
50 INJECTION, SOLUTION INTRAVENOUS CONTINUOUS
Status: DISCONTINUED | OUTPATIENT
Start: 2018-11-28 | End: 2018-11-28 | Stop reason: HOSPADM

## 2018-11-28 RX ORDER — LIDOCAINE HYDROCHLORIDE 20 MG/ML
INJECTION, SOLUTION EPIDURAL; INFILTRATION; INTRACAUDAL; PERINEURAL AS NEEDED
Status: DISCONTINUED | OUTPATIENT
Start: 2018-11-28 | End: 2018-11-28 | Stop reason: HOSPADM

## 2018-11-28 RX ORDER — MIDAZOLAM HYDROCHLORIDE 1 MG/ML
.25-5 INJECTION, SOLUTION INTRAMUSCULAR; INTRAVENOUS AS NEEDED
Status: DISCONTINUED | OUTPATIENT
Start: 2018-11-28 | End: 2018-11-28 | Stop reason: HOSPADM

## 2018-11-28 RX ORDER — ATROPINE SULFATE 0.1 MG/ML
0.5 INJECTION INTRAVENOUS
Status: DISCONTINUED | OUTPATIENT
Start: 2018-11-28 | End: 2018-11-28 | Stop reason: HOSPADM

## 2018-11-28 RX ORDER — NALOXONE HYDROCHLORIDE 0.4 MG/ML
0.4 INJECTION, SOLUTION INTRAMUSCULAR; INTRAVENOUS; SUBCUTANEOUS
Status: DISCONTINUED | OUTPATIENT
Start: 2018-11-28 | End: 2018-11-28 | Stop reason: HOSPADM

## 2018-11-28 RX ORDER — PHENOL/SODIUM PHENOLATE
40 AEROSOL, SPRAY (ML) MUCOUS MEMBRANE DAILY
COMMUNITY

## 2018-11-28 RX ORDER — FLUMAZENIL 0.1 MG/ML
0.2 INJECTION INTRAVENOUS
Status: DISCONTINUED | OUTPATIENT
Start: 2018-11-28 | End: 2018-11-28 | Stop reason: HOSPADM

## 2018-11-28 RX ORDER — FENTANYL CITRATE 50 UG/ML
25 INJECTION, SOLUTION INTRAMUSCULAR; INTRAVENOUS AS NEEDED
Status: DISCONTINUED | OUTPATIENT
Start: 2018-11-28 | End: 2018-11-28 | Stop reason: HOSPADM

## 2018-11-28 RX ORDER — PROPOFOL 10 MG/ML
INJECTION, EMULSION INTRAVENOUS AS NEEDED
Status: DISCONTINUED | OUTPATIENT
Start: 2018-11-28 | End: 2018-11-28 | Stop reason: HOSPADM

## 2018-11-28 RX ORDER — EPINEPHRINE 0.1 MG/ML
1 INJECTION INTRACARDIAC; INTRAVENOUS
Status: DISCONTINUED | OUTPATIENT
Start: 2018-11-28 | End: 2018-11-28 | Stop reason: HOSPADM

## 2018-11-28 RX ORDER — ERGOCALCIFEROL 1.25 MG/1
50000 CAPSULE ORAL
COMMUNITY

## 2018-11-28 RX ADMIN — PROPOFOL 80 MG: 10 INJECTION, EMULSION INTRAVENOUS at 08:23

## 2018-11-28 RX ADMIN — SODIUM CHLORIDE 50 ML/HR: 900 INJECTION, SOLUTION INTRAVENOUS at 08:11

## 2018-11-28 RX ADMIN — LIDOCAINE HYDROCHLORIDE 100 MG: 20 INJECTION, SOLUTION EPIDURAL; INFILTRATION; INTRACAUDAL; PERINEURAL at 08:23

## 2018-11-28 RX ADMIN — PROPOFOL 20 MG: 10 INJECTION, EMULSION INTRAVENOUS at 08:25

## 2018-11-28 NOTE — PROCEDURES
[address] [place], [place]  [phone]      2018    Esophagogastroduodenoscopy (EGD) Procedure Note  Adonay Meyers  : 1940  Nirmal Moyer Medical Record Number: [id number]      Indications:    Abdominal pain, epigastric  Referring Physician:  Stefania Jack MD  Anesthesia/Sedation:  Conscious sedation/deep sedation/monitored anesthesia -- see notes. Endoscopist:  Dr. Lamin Bray  Complications:  None  Estimated Blood Loss:  None    Permit:  The indications, risks, benefits and alternatives were reviewed with the patient or their decision maker who was provided an opportunity to ask questions and all questions were answered. The specific risks of esophagogastroduodenoscopy with conscious sedation were reviewed, including but not limited to anesthetic complication, bleeding, adverse drug reaction, missed lesion, infection, IV site reactions, and intestinal perforation which would lead to the need for surgical repair. Alternatives to EGD including radiographic imaging, observation without testing, or laboratory testing were reviewed as well as the limitations of those alternatives discussed. After considering the options and having all their questions answered, the patient or their decision maker provided both verbal and written consent to proceed. Procedure in Detail:  After obtaining informed consent, positioning of the patient in the left lateral decubitus position, and conduction of a pre-procedure pause or \"time out\" the endoscope was introduced into the mouth and advanced to the duodenum. A careful inspection was made, and findings or interventions are described below. Findings:   Esophagus:normal  Stomach: Ongoing ulceration of the antrum of stomach is noted, endoscopically / grossly appears benign acid peptic ulceration.    Biopsies taken for histology and exclusion of helicobacter pylori (she never completed the h pylori testing I suggested after last visit). Hemostasis from biopsy sites confirmed. Duodenum/jejunum: normal      Specimens: See above    Impression: PUD, better but not resolved. Recommendations:  -Acid suppression with a proton pump inhibitor. , -Await pathology. , -No NSAIDS    Resume anticoagulation tomorrow. Thank you for entrusting me with this patient's care. Please do not hesitate to contact me with any questions or if I can be of assistance with any of your other patients' GI needs.   Signed By: Winsome Hinton MD                        November 28, 2018     Surgical assistant none

## 2018-11-28 NOTE — ROUTINE PROCESS
Miah Barbal 1940 
[id number] Situation: 
Verbal report received from: [facility] Procedure: Procedure(s): ESOPHAGOGASTRODUODENOSCOPY (EGD) ESOPHAGOGASTRODUODENAL (EGD) BIOPSY Background: 
 
Preoperative diagnosis: HELICOBACTER PYLORI  
ESOPHAGITIS Postoperative diagnosis: Antral Ulcer :  Dr. Jn Kumar Assistant(s): Endoscopy Technician-1: Chito Fine 
Endoscopy RN-1: Yun Richardson RN Specimens:  
ID Type Source Tests Collected by Time Destination 1 : Antral Ulcer bx Preservative   Joel Douglas MD 11/28/2018 0840 Pathology H. Pylori  no Assessment: 
Intra-procedure medications Anesthesia gave intra-procedure sedation and medications, see anesthesia flow sheet yes Intravenous fluids: NS@ Ryan Lakesha Vital signs stable Abdominal assessment: round and soft Recommendation: 
Discharge patient per MD order. Return to floor Family or Friend Permission to share finding with family or friend yes

## 2018-11-28 NOTE — INTERVAL H&P NOTE
Pre-Endoscopy H&P Update Chief complaint/HPI/ROS:  The indication for the procedure, the patient's history and the patient's current medications are reviewed prior to the procedure and that data is reported on the H&P to which this document is attached. Any significant complaints with regard to organ systems will be noted, and if not mentioned then a review of systems is not contributory. Past Medical History:  
Diagnosis Date  Allergic rhinitis due to other allergen  Anxiety and depression  Arrhythmia \"palpitations\"-onset within past year-ATRIAL FIB STATES PATIENT  Arthritis   
 all joints, and states she needs left hip replaced  Atrial fibrillation ([facility] [address]  Dysthymic disorder  Dysthymic disorder  Esophageal abrasion 04/2014 Pt states she had 4 units of blood.  Essential hypertension, benign  H/O: hysterectomy  Hernia  Hx of bone density study 04/29/11  
 wnl  spine (1.7)  hip(1.0)  Hypertension  Mixed hyperlipidemia  Mixed hyperlipidemia  Thyroid disease   
 hypothyroid  Unspecified hypothyroidism Past Surgical History:  
Procedure Laterality Date  CARDIAC SURG PROCEDURE UNLIST  2014  
 ablation / Dr. Emely Bah  HX APPENDECTOMY  HX BREAST BIOPSY Left 05/03/16 Fat necrosis. 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  HX HEENT    
 tooth implants  HX HERNIA REPAIR  6512  
 umbilical hernia Dr Glenda Sherwood  HX HIP REPLACEMENT  7/58/89  
 complicated by A fib post op  HX HIP REPLACEMENT  01/22/14  
 left  HX HYSTERECTOMY  HX ORTHOPAEDIC  2014  
 left hip replacement  HX OTHER SURGICAL  07/2014 Heart Surgery for AFIB [address]  HX UROLOGICAL  2007  
 bladder tuck--colposacral susp- u-v suspension  LAP UMBILICAL HERNIA REPAIR  2009 Dr. Jhoana Levin Social  
Social History Tobacco Use  Smoking status: Former Smoker Packs/day: 1.00 Years: 30.00 Pack years: 30.00 Last attempt to quit: 1988 Years since quittin.0  Smokeless tobacco: Never Used Substance Use Topics  Alcohol use: No  
  
Family History Problem Relation Age of Onset  Hypertension Mother  Heart Disease Mother  Hypertension Brother  Cancer Father   
     prostate  Cancer Sister   
     breast  
 Cancer Maternal Aunt   
     breast  
 Anxiety Other  Allergic Rhinitis Other  Arthritis-osteo Other  Depression Other  Headache Other  Thyroid Disease Other  Cancer Other 52  
     breast  
 Cancer Other   
     breast  
 Cancer Other   
     breast/breast  
 Anesth Problems Neg Hx No Known Allergies Prior to Admission Medications Prescriptions Last Dose Informant Patient Reported? Taking? ELIQUIS 5 mg tablet 2018  Yes No  
Si mg two (2) times a day. LORazepam (ATIVAN) 0.5 mg tablet 2018 at Unknown time  No Yes Sig: Take 1 Tab by mouth two (2) times daily as needed for Anxiety. Omeprazole delayed release (PRILOSEC D/R) 20 mg tablet   Yes Yes Sig: Take 40 mg by mouth daily. buPROPion XL (WELLBUTRIN XL) 300 mg XL tablet 2018 at Unknown time  No Yes Sig: Take 1 Tab by mouth every morning.  
ergocalciferol (VITAMIN D2) 50,000 unit capsule   Yes Yes Sig: Take 50,000 Units by mouth every fourteen (14) days. estradiol (CLIMARA) 0.05 mg/24 hr 2018  No No  
Si Patch by TransDERmal route Every Saturday. levothyroxine (SYNTHROID) 100 mcg tablet 2018 at Unknown time  No Yes Sig: Take 1 Tab by mouth Daily (before breakfast). lisinopril (PRINIVIL, ZESTRIL) 20 mg tablet   Yes Yes Sig: Take  by mouth daily. losartan (COZAAR) 100 mg tablet 2018 at Unknown time  Yes Yes Sig: Take 100 mg by mouth daily. sertraline (ZOLOFT) 50 mg tablet   Yes Yes Sig: Take  by mouth daily.   
traMADol (ULTRAM) 50 mg tablet   No No  
 Sig: Take 1 Tab by mouth every eight (8) hours as needed for Pain. Patient taking differently: Take 50 mg by mouth every eight (8) hours as needed for Pain. 1/2 tablet every 8 hours as needed Facility-Administered Medications: None PHYSICAL EXAM:  The patient is examined immediately prior to the procedure. Visit Vitals /90 Pulse 91 Temp 97.8 °F (36.6 °C) Resp 20 Ht 5' 5\" (1.651 m) Wt 101.2 kg (223 lb) SpO2 99% Breastfeeding? No  
BMI 37.11 kg/m² Gen: Appears comfortable, no distress. Pulm: comfortable respirations with no abnormal audible breath sounds HEART: well perfused, no abnormal audible heart sounds GI: abdomen flat. PLAN:  Informed consent discussion held, patient afforded an opportunity to ask questions and all questions answered. After being advised of the risks, benefits, and alternatives, the patient requested that we proceed and indicated so on a written consent form. Will proceed with procedure as planned.  
Hero Sanchez MD

## 2018-11-28 NOTE — ANESTHESIA POSTPROCEDURE EVALUATION
Procedure(s): ESOPHAGOGASTRODUODENOSCOPY (EGD) ESOPHAGOGASTRODUODENAL (EGD) BIOPSY. Anesthesia Post Evaluation Multimodal analgesia: multimodal analgesia not used between 6 hours prior to anesthesia start to PACU discharge Patient location during evaluation: PACU Patient participation: complete - patient participated Level of consciousness: awake Pain management: adequate Airway patency: patent Anesthetic complications: no 
Cardiovascular status: acceptable, blood pressure returned to baseline and hemodynamically stable Respiratory status: acceptable Hydration status: acceptable Visit Vitals /85 Pulse 97 Temp 36.4 °C (97.6 °F) Resp 17 Ht 5' 5\" (1.651 m) Wt 101.2 kg (223 lb) SpO2 95% Breastfeeding? No  
BMI 37.11 kg/m²

## 2018-11-28 NOTE — PROGRESS NOTES
Assumed care of patient from anesthesia. Endoscope was pre-cleaned at bedside immediately following procedure by Patricia Trejo. Report received from [id number] [facility] anesthesia flow-sheet for procedural sedation and vital signs. No respiratory distress. Abdomen without distention. Stable for transfer to recovery per anesthesia.

## 2018-11-28 NOTE — DISCHARGE INSTRUCTIONS
[address] [facility] KIKI Jara MD  [phone]      November 28, 2018     Danny Eastman  YOB: 1940    ENDOSCOPY DISCHARGE INSTRUCTIONS    If there is redness at IV site you should apply warm compress to area. If redness or soreness persist contact Dr. Page Jara' or your primary care doctor. Gaseous discomfort may develop, but walking, belching will help relieve this. You may not operate a vehicle for 12 hours  You may not operate machinery or dangerous appliances for rest of today  You may not drink alcoholic beverages for 12 hours  Avoid making any critical decisions for 24 hours    DIET:  You may resume your normal diet, but some patients find that heavy or large meals may lead to indigestion or vomiting. I suggest a light meal as first food intake. MEDICATIONS:  The use of some over-the-counter pain medication may lead to bleeding after biopsies or other procedures you may have had done. Tylenol (also called acetaminophen) is safe to take and will not lead to bleeding. Based on the procedure you had today you may not safely take aspirin or aspirin-like products for the next ten (10) days. ACTIVITY:  You may resume your normal household activities, but it is recommended that you spend the remainder of the day resting -  avoid any strenuous activity. CALL DR. Uri Baca' OFFICE IF:  Increasing pain, nausea, vomiting  Abdominal distension (swelling)  Significant new or increased bleeding (oral or rectal)  Fever/Chills  Chest pain/shortness of breath                   Additional instructions:   No aspirin 10 days  Restart eliquis tomorrow   The ulcer is better but not gone, continue ulcer medication. I will contact with biopsy results when available. It was an honor to be your doctor today. Please let me or my office staff know if you have any feedback about today's procedure.     Ltanya Bibles Lawson Miller MD

## 2019-01-03 ENCOUNTER — OFFICE VISIT (OUTPATIENT)
Dept: OBGYN CLINIC | Age: 79
End: 2019-01-03

## 2019-01-03 VITALS
WEIGHT: 223 LBS | HEIGHT: 65 IN | BODY MASS INDEX: 37.15 KG/M2 | SYSTOLIC BLOOD PRESSURE: 118 MMHG | DIASTOLIC BLOOD PRESSURE: 70 MMHG

## 2019-01-03 DIAGNOSIS — N95.1 SYMPTOMATIC MENOPAUSAL OR FEMALE CLIMACTERIC STATES: ICD-10-CM

## 2019-01-03 PROBLEM — E66.01 SEVERE OBESITY (HCC): Status: ACTIVE | Noted: 2019-01-03

## 2019-01-03 RX ORDER — ESTRADIOL 0.05 MG/D
1 PATCH TRANSDERMAL
Qty: 12 PATCH | Refills: 4 | Status: SHIPPED | OUTPATIENT
Start: 2019-01-05

## 2019-01-03 NOTE — PROGRESS NOTES
Annual exam ages 69+ post hysterectomy Casey Bacon is a ,  69 y.o. female [facility] [place] No LMP recorded. Patient has had a hysterectomy. She presents for her annual checkup. She is having no significant problems. With regard to the Gardasil vaccine, she is older than the age for which it is FDA approved. Hormonal status: She reports no perimenstrual type symptoms. She is not having vasomotor symptoms. The patient is using the Estradiol patch as an ERT. Sexual history: She  reports that she currently engages in sexual activity and has had partners who are Male. She reports using the following method of birth control/protection: Surgical. 
 
Medical conditions: 
 
Since her last annual GYN exam about one year ago, she has not the following changes in her health history:  A Fib and Breast bx. Surgical history confirmed with patient. has a past surgical history that includes lap umbilical hernia repair (); hx urological (); hx heent; hx appendectomy; hx total abdominal hysterectomy (); hx hip replacement (14); hx hip replacement (14); hx other surgical (2014); hx breast biopsy (Left, 16); pr cardiac surg procedure unlist (); hx hernia repair (); hx orthopaedic (); ESOPHAGOGASTRODUODENOSCOPY (EGD) (N/A, 2018); ESOPHAGOGASTRODUODENAL (EGD) BIOPSY (N/A, 2018); ESOPHAGOGASTRODUODENOSCOPY (EGD) (N/A, 2018); LEFT TOTAL HIP ARTHROPLASTY(GEN/SPINAL) (Left, 2014); and LEFT BREAST EXCISIONAL BIOPSY WITH ULTRASOUND (Left, 2013). Pap and Mammogram History: 
 
Her most recent Pap smear was normal obtained 8 year(s) ago. The patient had her mammogram today in our office. Breast Cancer History/Substance Abuse: negative Osteoporosis History: 
 
Family history does not include a first or second degree relative with osteopenia or osteoporosis. A bone density scan was obtained 6 years ago and revealed a normal scan. She is currently taking vit D. Past Medical History:  
Diagnosis Date  Allergic rhinitis due to other allergen  Anxiety and depression  Arrhythmia \"palpitations\"-onset within past year-ATRIAL FIB STATES PATIENT  Arthritis   
 all joints, and states she needs left hip replaced  Atrial fibrillation ([address]  Dysthymic disorder  Dysthymic disorder  Esophageal abrasion 04/2014 Pt states she had 4 units of blood.  Essential hypertension, benign  H/O: hysterectomy  Hernia  Hx of bone density study 04/29/11  
 wnl  spine (1.7)  hip(1.0)  Hypertension  Mixed hyperlipidemia  Mixed hyperlipidemia  Thyroid disease   
 hypothyroid  Unspecified hypothyroidism Past Surgical History:  
Procedure Laterality Date  CARDIAC SURG PROCEDURE UNLIST  2014  
 ablation / Dr. Talat Juárez  HX APPENDECTOMY  HX BREAST BIOPSY  11/6/2013 LEFT papilloma  HX BREAST BIOPSY Left 05/03/16 Fat necrosis. 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  HX HEENT    
 tooth implants  HX HERNIA REPAIR  3796  
 umbilical hernia Dr Killian Jackson  HX HIP REPLACEMENT  0/82/53  
 complicated by A fib post op  HX HIP REPLACEMENT  01/22/14  
 left  HX ORTHOPAEDIC  2014  
 left hip replacement  HX OTHER SURGICAL  07/2014 Heart Surgery for AFIB [address]  HX UROLOGICAL  2007  
 bladder tuck--colposacral susp- u-v suspension  LAP UMBILICAL HERNIA REPAIR  2009 Dr. Tracy Bah Current Outpatient Medications Medication Sig Dispense Refill  ergocalciferol (VITAMIN D2) 50,000 unit capsule Take 50,000 Units by mouth every fourteen (14) days.  Omeprazole delayed release (PRILOSEC D/R) 20 mg tablet Take 40 mg by mouth daily.  losartan (COZAAR) 100 mg tablet Take 100 mg by mouth daily.  ELIQUIS 5 mg tablet 5 mg two (2) times a day.  estradiol (CLIMARA) 0.05 mg/24 hr 1 Patch by TransDERmal route Every Saturday. 12 Patch 4  
 traMADol (ULTRAM) 50 mg tablet Take 1 Tab by mouth every eight (8) hours as needed for Pain. (Patient taking differently: Take 50 mg by mouth every eight (8) hours as needed for Pain. 1/2 tablet every 8 hours as needed) 30 Tab 0  
 levothyroxine (SYNTHROID) 100 mcg tablet Take 1 Tab by mouth Daily (before breakfast). 90 Tab 3  
 LORazepam (ATIVAN) 0.5 mg tablet Take 1 Tab by mouth two (2) times daily as needed for Anxiety. 180 Tab 1  
 buPROPion XL (WELLBUTRIN XL) 300 mg XL tablet Take 1 Tab by mouth every morning. 90 Tab 3  
 lisinopril (PRINIVIL, ZESTRIL) 20 mg tablet Take  by mouth daily. Allergies: Patient has no known allergies. Tobacco History:  reports that she quit smoking about 30 years ago. She has a 30.00 pack-year smoking history. she has never used smokeless tobacco. 
Alcohol Abuse:  reports that she does not drink alcohol. Drug Abuse:  reports that she does not use drugs. Family Medical/Cancer History:  
Family History Problem Relation Age of Onset  Hypertension Mother  Heart Disease Mother  Hypertension Brother  Cancer Father   
     prostate  Cancer Sister   
     breast  
 Cancer Maternal Aunt   
     breast  
 Anxiety Other  Allergic Rhinitis Other  Arthritis-osteo Other  Depression Other  Headache Other  Thyroid Disease Other  Cancer Other 52  
     breast  
 Cancer Other   
     breast  
 Cancer Other   
     breast/breast  
 Anesth Problems Neg Hx Review of Systems - History obtained from the patient Constitutional: negative for weight loss, fever, night sweats HEENT: negative for hearing loss, earache, congestion, snoring, sorethroat CV: negative for chest pain, palpitations, edema Resp: negative for cough, shortness of breath, wheezing GI: negative for change in bowel habits, abdominal pain, black or bloody stools : negative for frequency, dysuria, hematuria, vaginal discharge MSK: negative for back pain, joint pain, muscle pain Breast: negative for breast lumps, nipple discharge, galactorrhea Skin :negative for itching, rash, hives Neuro: negative for dizziness, headache, confusion, weakness Psych: negative for anxiety, depression, change in mood Heme/lymph: negative for bleeding, bruising, pallor Physical Exam 
 
Visit Vitals /70 Ht 5' 5\" (1.651 m) Wt 223 lb (101.2 kg) BMI 37.11 kg/m² Constitutional 
· Appearance: well-nourished, well developed, alert, in no acute distress HENT 
· Head and Face: appears normal 
 
Neck · Inspection/Palpation: normal appearance, no masses or tenderness · Lymph Nodes: no lymphadenopathy present · Thyroid: gland size normal, nontender, no nodules or masses present on palpation Chest 
· Respiratory Effort: breathing unlabored · Auscultation: normal breath sounds Cardiovascular · Heart: 
· Auscultation: regular rate and rhythm without murmur Breasts · Inspection of Breasts: breasts symmetrical, no skin changes, no discharge present, nipple appearance normal, no skin retraction present · Palpation of Breasts and Axillae: no masses present on palpation, no breast tenderness · Axillary Lymph Nodes: no lymphadenopathy present Gastrointestinal 
· Abdominal Examination: abdomen non-tender to palpation, normal bowel sounds, no masses present · Liver and spleen: no hepatomegaly present, spleen not palpable · Hernias: no hernias identified Genitourinary · External Genitalia: normal appearance for age, no discharge present, no tenderness present, no inflammatory lesions present, no masses present, atrophy present · Vagina: atrophic but otherwise normal vaginal vault without central or paravaginal defects, no discharge present, no inflammatory lesions present, no masses present · Bladder: non-tender to palpation · Urethra: appears normal 
· Cervix: absent · Uterus: absent · Adnexa: no adnexal tenderness present, no adnexal masses present · Perineum: perineum within normal limits, no evidence of trauma, no rashes or skin lesions present · Anus: anus within normal limits, no hemorrhoids present · Inguinal Lymph Nodes: no lymphadenopathy present Skin · General Inspection: no rash, no lesions identified Neurologic/Psychiatric · Mental Status: · Orientation: grossly oriented to person, place and time · Mood and Affect: mood normal, affect appropriate Assessment: 
Routine gynecologic examination Her current medical status is satisfactory with no evidence of significant gynecologic issues. Plan: 
Counseled re: diet, exercise, healthy lifestyle Return for yearly wellness visits Rec annual mammogram

## 2019-01-03 NOTE — PATIENT INSTRUCTIONS

## 2019-01-03 NOTE — PROGRESS NOTES
Annual exam ages 69+ post hysterectomy Scott Martinez is a ,  69 y.o. female [facility] [place] No LMP recorded. Patient has had a hysterectomy. She presents for her annual checkup. She is having no significant problems. With regard to the Gardasil vaccine, she is older than the age for which it is FDA approved. Hormonal status: She reports no perimenstrual type symptoms. She is not having vasomotor symptoms. The patient is not using any ERT. Sexual history: She  reports that she currently engages in sexual activity and has had partners who are Male. She reports using the following method of birth control/protection: Surgical. 
 
Medical conditions: 
 
Since her last annual GYN exam about one year ago, she has not the following changes in her health history: none. Surgical history confirmed with patient. has a past surgical history that includes lap umbilical hernia repair (); hx urological (); hx heent; hx appendectomy; hx total abdominal hysterectomy (); hx hip replacement (14); hx hip replacement (14); hx other surgical (2014); hx breast biopsy (Left, 16); pr cardiac surg procedure unlist (); hx hernia repair (); hx orthopaedic (); ESOPHAGOGASTRODUODENOSCOPY (EGD) (N/A, 2018); ESOPHAGOGASTRODUODENAL (EGD) BIOPSY (N/A, 2018); ESOPHAGOGASTRODUODENOSCOPY (EGD) (N/A, 2018); LEFT TOTAL HIP ARTHROPLASTY(GEN/SPINAL) (Left, 2014); and LEFT BREAST EXCISIONAL BIOPSY WITH ULTRASOUND (Left, 2013). Pap and Mammogram History: 
 
Her most recent Pap smear was normal obtained 8 year(s) ago. The patient had her mammogram today in our office. Breast Cancer History/Substance Abuse: negative Osteoporosis History: 
 
Family history does not include a first or second degree relative with osteopenia or osteoporosis.  
 
A bone density scan was obtained 6 years ago and revealed normal bone mass.   She is currently taking calcium and vit D. Past Medical History:  
Diagnosis Date  Allergic rhinitis due to other allergen  Anxiety and depression  Arrhythmia \"palpitations\"-onset within past year-ATRIAL FIB STATES PATIENT  Arthritis   
 all joints, and states she needs left hip replaced  Atrial fibrillation ([address]  Dysthymic disorder  Dysthymic disorder  Esophageal abrasion 04/2014 Pt states she had 4 units of blood.  Essential hypertension, benign  H/O: hysterectomy  Hernia  Hx of bone density study 04/29/11  
 wnl  spine (1.7)  hip(1.0)  Hypertension  Mixed hyperlipidemia  Mixed hyperlipidemia  Thyroid disease   
 hypothyroid  Unspecified hypothyroidism Past Surgical History:  
Procedure Laterality Date  CARDIAC SURG PROCEDURE UNLIST  2014  
 ablation / Dr. Joseph Copeland  HX APPENDECTOMY  HX BREAST BIOPSY  11/6/2013 LEFT papilloma  HX BREAST BIOPSY Left 05/03/16 Fat necrosis. 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  HX HEENT    
 tooth implants  HX HERNIA REPAIR  5482  
 umbilical hernia Dr Barbara Mcdonald  HX HIP REPLACEMENT  8/07/32  
 complicated by A fib post op  HX HIP REPLACEMENT  01/22/14  
 left  HX ORTHOPAEDIC  2014  
 left hip replacement  HX OTHER SURGICAL  07/2014 Heart Surgery for AFIB [address]  HX UROLOGICAL  2007  
 bladder tuck--colposacral susp- u-v suspension  LAP UMBILICAL HERNIA REPAIR  2009 Dr. Shlomo Hackett Current Outpatient Medications Medication Sig Dispense Refill  ergocalciferol (VITAMIN D2) 50,000 unit capsule Take 50,000 Units by mouth every fourteen (14) days.  Omeprazole delayed release (PRILOSEC D/R) 20 mg tablet Take 40 mg by mouth daily.  lisinopril (PRINIVIL, ZESTRIL) 20 mg tablet Take  by mouth daily.  losartan (COZAAR) 100 mg tablet Take 100 mg by mouth daily.  ELIQUIS 5 mg tablet 5 mg two (2) times a day.  estradiol (CLIMARA) 0.05 mg/24 hr 1 Patch by TransDERmal route Every Saturday. 12 Patch 4  
 traMADol (ULTRAM) 50 mg tablet Take 1 Tab by mouth every eight (8) hours as needed for Pain. (Patient taking differently: Take 50 mg by mouth every eight (8) hours as needed for Pain. 1/2 tablet every 8 hours as needed) 30 Tab 0  
 levothyroxine (SYNTHROID) 100 mcg tablet Take 1 Tab by mouth Daily (before breakfast). 90 Tab 3  
 LORazepam (ATIVAN) 0.5 mg tablet Take 1 Tab by mouth two (2) times daily as needed for Anxiety. 180 Tab 1  
 buPROPion XL (WELLBUTRIN XL) 300 mg XL tablet Take 1 Tab by mouth every morning. 90 Tab 3 Allergies: Patient has no known allergies. Tobacco History:  reports that she quit smoking about 30 years ago. She has a 30.00 pack-year smoking history. she has never used smokeless tobacco. 
Alcohol Abuse:  reports that she does not drink alcohol. Drug Abuse:  reports that she does not use drugs. Family Medical/Cancer History:  
Family History Problem Relation Age of Onset  Hypertension Mother  Heart Disease Mother  Hypertension Brother  Cancer Father   
     prostate  Cancer Sister   
     breast  
 Cancer Maternal Aunt   
     breast  
 Anxiety Other  Allergic Rhinitis Other  Arthritis-osteo Other  Depression Other  Headache Other  Thyroid Disease Other  Cancer Other 51  
     breast  
 Cancer Other   
     breast  
 Cancer Other   
     breast/breast  
 Anesth Problems Neg Hx Review of Systems - History obtained from the patient Constitutional: negative for weight loss, fever, night sweats HEENT: negative for hearing loss, earache, congestion, snoring, sorethroat CV: negative for chest pain, palpitations, edema Resp: negative for cough, shortness of breath, wheezing GI: negative for change in bowel habits, abdominal pain, black or bloody stools : negative for frequency, dysuria, hematuria, vaginal discharge MSK: negative for back pain, joint pain, muscle pain Breast: negative for breast lumps, nipple discharge, galactorrhea Skin :negative for itching, rash, hives Neuro: negative for dizziness, headache, confusion, weakness Psych: negative for anxiety, depression, change in mood Heme/lymph: negative for bleeding, bruising, pallor Physical Exam 
 
There were no vitals taken for this visit. Constitutional 
· Appearance: well-nourished, well developed, alert, in no acute distress HENT 
· Head and Face: appears normal 
 
Neck · Inspection/Palpation: normal appearance, no masses or tenderness · Lymph Nodes: no lymphadenopathy present · Thyroid: gland size normal, nontender, no nodules or masses present on palpation Chest 
· Respiratory Effort: breathing unlabored · Auscultation: normal breath sounds Cardiovascular · Heart: 
· Auscultation: regular rate and rhythm without murmur Breasts · Inspection of Breasts: breasts symmetrical, no skin changes, no discharge present, nipple appearance normal, no skin retraction present · Palpation of Breasts and Axillae: no masses present on palpation, no breast tenderness · Axillary Lymph Nodes: no lymphadenopathy present Gastrointestinal 
· Abdominal Examination: abdomen non-tender to palpation, normal bowel sounds, no masses present · Liver and spleen: no hepatomegaly present, spleen not palpable · Hernias: no hernias identified Genitourinary · External Genitalia: normal appearance for age, no discharge present, no tenderness present, no inflammatory lesions present, no masses present, atrophy present · Vagina: atrophic but otherwise normal vaginal vault without central or paravaginal defects, no discharge present, no inflammatory lesions present, no masses present · Bladder: non-tender to palpation · Urethra: appears normal 
· Cervix: absent · Uterus: absent · Adnexa: no adnexal tenderness present, no adnexal masses present · Perineum: perineum within normal limits, no evidence of trauma, no rashes or skin lesions present · Anus: anus within normal limits, no hemorrhoids present · Inguinal Lymph Nodes: no lymphadenopathy present Skin · General Inspection: no rash, no lesions identified Neurologic/Psychiatric · Mental Status: · Orientation: grossly oriented to person, place and time · Mood and Affect: mood normal, affect appropriate Assessment: 
Routine gynecologic examination Her current medical status is satisfactory with no evidence of significant gynecologic issues. Plan: 
Counseled re: diet, exercise, healthy lifestyle Return for yearly wellness visits Rec annual mammogram

## 2019-03-05 ENCOUNTER — ANESTHESIA EVENT (OUTPATIENT)
Dept: ENDOSCOPY | Age: 79
End: 2019-03-05

## 2019-03-05 ENCOUNTER — ANESTHESIA (OUTPATIENT)
Dept: ENDOSCOPY | Age: 79
End: 2019-03-05

## 2019-03-05 NOTE — ANESTHESIA PREPROCEDURE EVALUATION
Anesthetic History   No history of anesthetic complications            Review of Systems / Medical History  Patient summary reviewed, nursing notes reviewed and pertinent labs reviewed    Pulmonary  Within defined limits                 Neuro/Psych         Psychiatric history     Cardiovascular    Hypertension        Dysrhythmias : atrial fibrillation  Hyperlipidemia    Exercise tolerance: >4 METS     GI/Hepatic/Renal  Within defined limits              Endo/Other      Hypothyroidism  Obesity and arthritis     Other Findings   Comments: Hx esophageal abrasion           Physical Exam    Airway  Mallampati: II    Neck ROM: normal range of motion   Mouth opening: Normal     Cardiovascular  Regular rate and rhythm,  S1 and S2 normal,  no murmur, click, rub, or gallop  Rhythm: regular  Rate: normal         Dental  No notable dental hx       Pulmonary  Breath sounds clear to auscultation               Abdominal  GI exam deferred       Other Findings            Anesthetic Plan    ASA: 3  Anesthesia type: MAC          Induction: Intravenous  Anesthetic plan and risks discussed with: Patient

## 2019-03-19 NOTE — H&P
69 y.o. female for open access EGD for ulcer followup   Additional data for completion of the targeted pre-endoscopy H&P will be provided under 'H&P interval notes'. Please see that document which will be attached to this.   Vadim Sharp MD

## 2019-03-20 ENCOUNTER — HOSPITAL ENCOUNTER (OUTPATIENT)
Age: 79
Setting detail: OUTPATIENT SURGERY
Discharge: HOME | End: 2019-03-20
Attending: SPECIALIST | Admitting: SPECIALIST
Payer: MEDICARE

## 2019-03-20 ENCOUNTER — ANESTHESIA EVENT (OUTPATIENT)
Dept: ENDOSCOPY | Age: 79
End: 2019-03-20
Payer: MEDICARE

## 2019-03-20 ENCOUNTER — ANESTHESIA (OUTPATIENT)
Dept: ENDOSCOPY | Age: 79
End: 2019-03-20
Payer: MEDICARE

## 2019-03-20 VITALS
DIASTOLIC BLOOD PRESSURE: 57 MMHG | OXYGEN SATURATION: 100 % | SYSTOLIC BLOOD PRESSURE: 146 MMHG | WEIGHT: 220 LBS | BODY MASS INDEX: 36.65 KG/M2 | TEMPERATURE: 97.7 F | RESPIRATION RATE: 17 BRPM | HEART RATE: 52 BPM | HEIGHT: 65 IN

## 2019-03-20 PROCEDURE — 88305 TISSUE EXAM BY PATHOLOGIST: CPT

## 2019-03-20 PROCEDURE — 76040000019: Performed by: SPECIALIST

## 2019-03-20 PROCEDURE — 74011250636 HC RX REV CODE- 250/636: Performed by: PHYSICIAN ASSISTANT

## 2019-03-20 PROCEDURE — 74011250636 HC RX REV CODE- 250/636

## 2019-03-20 PROCEDURE — 76060000031 HC ANESTHESIA FIRST 0.5 HR: Performed by: SPECIALIST

## 2019-03-20 PROCEDURE — 77030009426 HC FCPS BIOP ENDOSC BSC -B: Performed by: SPECIALIST

## 2019-03-20 RX ORDER — MIDAZOLAM HYDROCHLORIDE 1 MG/ML
.25-5 INJECTION, SOLUTION INTRAMUSCULAR; INTRAVENOUS AS NEEDED
Status: DISCONTINUED | OUTPATIENT
Start: 2019-03-20 | End: 2019-03-20 | Stop reason: HOSPADM

## 2019-03-20 RX ORDER — ATROPINE SULFATE 0.1 MG/ML
0.5 INJECTION INTRAVENOUS
Status: DISCONTINUED | OUTPATIENT
Start: 2019-03-20 | End: 2019-03-20 | Stop reason: HOSPADM

## 2019-03-20 RX ORDER — SODIUM CHLORIDE 9 MG/ML
50 INJECTION, SOLUTION INTRAVENOUS CONTINUOUS
Status: DISPENSED | OUTPATIENT
Start: 2019-03-20 | End: 2019-03-20

## 2019-03-20 RX ORDER — NALOXONE HYDROCHLORIDE 0.4 MG/ML
0.4 INJECTION, SOLUTION INTRAMUSCULAR; INTRAVENOUS; SUBCUTANEOUS
Status: ACTIVE | OUTPATIENT
Start: 2019-03-20 | End: 2019-03-20

## 2019-03-20 RX ORDER — FENTANYL CITRATE 50 UG/ML
25 INJECTION, SOLUTION INTRAMUSCULAR; INTRAVENOUS AS NEEDED
Status: DISCONTINUED | OUTPATIENT
Start: 2019-03-20 | End: 2019-03-20 | Stop reason: HOSPADM

## 2019-03-20 RX ORDER — SODIUM CHLORIDE 9 MG/ML
INJECTION, SOLUTION INTRAVENOUS
Status: DISCONTINUED | OUTPATIENT
Start: 2019-03-20 | End: 2019-03-20 | Stop reason: HOSPADM

## 2019-03-20 RX ORDER — PROPOFOL 10 MG/ML
INJECTION, EMULSION INTRAVENOUS AS NEEDED
Status: DISCONTINUED | OUTPATIENT
Start: 2019-03-20 | End: 2019-03-20 | Stop reason: HOSPADM

## 2019-03-20 RX ORDER — EPINEPHRINE 0.1 MG/ML
1 INJECTION INTRACARDIAC; INTRAVENOUS
Status: DISCONTINUED | OUTPATIENT
Start: 2019-03-20 | End: 2019-03-20 | Stop reason: HOSPADM

## 2019-03-20 RX ORDER — FLUMAZENIL 0.1 MG/ML
0.2 INJECTION INTRAVENOUS
Status: ACTIVE | OUTPATIENT
Start: 2019-03-20 | End: 2019-03-20

## 2019-03-20 RX ORDER — LIDOCAINE HYDROCHLORIDE 20 MG/ML
INJECTION, SOLUTION EPIDURAL; INFILTRATION; INTRACAUDAL; PERINEURAL AS NEEDED
Status: DISCONTINUED | OUTPATIENT
Start: 2019-03-20 | End: 2019-03-20 | Stop reason: HOSPADM

## 2019-03-20 RX ORDER — DEXTROMETHORPHAN/PSEUDOEPHED 2.5-7.5/.8
1.2 DROPS ORAL
Status: DISCONTINUED | OUTPATIENT
Start: 2019-03-20 | End: 2019-03-20 | Stop reason: HOSPADM

## 2019-03-20 RX ADMIN — SODIUM CHLORIDE 50 ML/HR: 900 INJECTION, SOLUTION INTRAVENOUS at 09:07

## 2019-03-20 RX ADMIN — PROPOFOL 60 MG: 10 INJECTION, EMULSION INTRAVENOUS at 09:16

## 2019-03-20 RX ADMIN — PROPOFOL 40 MG: 10 INJECTION, EMULSION INTRAVENOUS at 09:18

## 2019-03-20 RX ADMIN — LIDOCAINE HYDROCHLORIDE 20 MG: 20 INJECTION, SOLUTION EPIDURAL; INFILTRATION; INTRACAUDAL; PERINEURAL at 09:16

## 2019-03-20 RX ADMIN — SODIUM CHLORIDE: 9 INJECTION, SOLUTION INTRAVENOUS at 08:43

## 2019-03-20 NOTE — INTERVAL H&P NOTE
Pre-Endoscopy H&P Update Chief complaint/HPI/ROS:  The indication for the procedure, the patient's history and the patient's current medications are reviewed prior to the procedure and that data is reported on the H&P to which this document is attached. Any significant complaints with regard to organ systems will be noted, and if not mentioned then a review of systems is not contributory. Past Medical History:  
Diagnosis Date  Allergic rhinitis due to other allergen  Anxiety and depression  Arrhythmia \"palpitations\"-onset within past year-ATRIAL FIB STATES PATIENT  Arthritis   
 all joints, and states she needs left hip replaced  Atrial fibrillation ([address]   
 had ablation in January of this year and has been in NSR since per patient  Dysthymic disorder  Dysthymic disorder  Esophageal abrasion 04/2014 Pt states she had 4 units of blood.  Essential hypertension, benign  H/O: hysterectomy  Hernia  Hx of bone density study 04/29/11  
 wnl  spine (1.7)  hip(1.0)  Hypertension  Mixed hyperlipidemia  Mixed hyperlipidemia  PUD (peptic ulcer disease)   
 ulcers 2004  Thyroid disease   
 hypothyroid  Unspecified hypothyroidism Past Surgical History:  
Procedure Laterality Date  CARDIAC SURG PROCEDURE UNLIST  2014  
 ablation / Dr. Abby Marquez  CARDIAC SURG PROCEDURE UNLIST  01/2019  
 ablation/ Dr. Abby Marquez  HX APPENDECTOMY  HX BREAST BIOPSY  11/6/2013 LEFT papilloma  HX BREAST BIOPSY Left 05/03/16 Fat necrosis. 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 [address]  
 uterus removed  HX HEENT    
 tooth implants  HX HERNIA REPAIR  [address]  
 umbilical hernia Dr Po Jalloh  HX HIP REPLACEMENT  3/67/61  
 complicated by A fib post op  HX HIP REPLACEMENT  01/22/14  
 left  HX ORTHOPAEDIC  2014  
 left hip replacement  HX OTHER SURGICAL  07/2014 Heart Surgery for AFIB [address]  HX UROLOGICAL  2007  
 bladder tuck--colposacral susp- u-v suspension  LAP UMBILICAL HERNIA REPAIR  2009 Dr. Alysha Oliver Social  
Social History Tobacco Use  Smoking status: Former Smoker Packs/day: 1.00 Years: 30.00 Pack years: 30.00 Last attempt to quit: 1988 Years since quittin.3  Smokeless tobacco: Never Used Substance Use Topics  Alcohol use: No  
  
Family History Problem Relation Age of Onset  Hypertension Mother  Heart Disease Mother  Hypertension Brother  Cancer Father   
     prostate  Cancer Sister   
     breast  
 Cancer Maternal Aunt   
     breast  
 Anxiety Other  Allergic Rhinitis Other  Arthritis-osteo Other  Depression Other  Headache Other  Thyroid Disease Other  Cancer Other 51  
     breast  
 Cancer Other   
     breast  
 Cancer Other   
     breast/breast  
 Anesth Problems Neg Hx No Known Allergies Prior to Admission Medications Prescriptions Last Dose Informant Patient Reported? Taking? ELIQUIS 5 mg tablet 3/17/2019 at pm  Yes No  
Si mg two (2) times a day. LORazepam (ATIVAN) 0.5 mg tablet 3/19/2019 at am  No No  
Sig: Take 1 Tab by mouth two (2) times daily as needed for Anxiety. Omeprazole delayed release (PRILOSEC D/R) 20 mg tablet 3/20/2019 at 0430  Yes No  
Sig: Take 40 mg by mouth daily. buPROPion XL (WELLBUTRIN XL) 300 mg XL tablet 3/20/2019 at 0430  No No  
Sig: Take 1 Tab by mouth every morning.  
ergocalciferol (VITAMIN D2) 50,000 unit capsule 3/16/2019 at am  Yes No  
Sig: Take 50,000 Units by mouth every fourteen (14) days. estradiol (CLIMARA) 0.05 mg/24 hr 3/16/2019 at am  No No  
Si Patch by TransDERmal route Every Saturday. levothyroxine (SYNTHROID) 100 mcg tablet 3/19/2019 at am  No No  
Sig: Take 1 Tab by mouth Daily (before breakfast). lisinopril (PRINIVIL, ZESTRIL) 20 mg tablet 3/20/2019 at 0430  Yes No  
Sig: Take  by mouth daily. losartan (COZAAR) 100 mg tablet 3/20/2019 at 0430  Yes No  
Sig: Take 100 mg by mouth daily. traMADol (ULTRAM) 50 mg tablet 3/19/2019 at am  No No  
Sig: Take 1 Tab by mouth every eight (8) hours as needed for Pain. Patient taking differently: Take 50 mg by mouth every eight (8) hours as needed for Pain. 1/2 tablet every 8 hours as needed Facility-Administered Medications: None PHYSICAL EXAM:  The patient is examined immediately prior to the procedure. Visit Vitals /67 Pulse (!) 50 Temp 97.8 °F (36.6 °C) Resp 12 Ht 5' 5\" (1.651 m) Wt 99.8 kg (220 lb) SpO2 98% Breastfeeding? No  
BMI 36.61 kg/m² Gen: Appears comfortable, no distress. Pulm: comfortable respirations with no abnormal audible breath sounds HEART: well perfused, no abnormal audible heart sounds GI: abdomen flat. PLAN:  Informed consent discussion held, patient afforded an opportunity to ask questions and all questions answered. After being advised of the risks, benefits, and alternatives, the patient requested that we proceed and indicated so on a written consent form. Will proceed with procedure as planned.  
Maritza Pak MD

## 2019-03-20 NOTE — ROUTINE PROCESS
Major Finely 1940 
[id number] Situation: 
Verbal report received from: Marilia Ng RN Cynthia Fair RN Procedure: Procedure(s): ESOPHAGOGASTRODUODENOSCOPY (EGD) ESOPHAGOGASTRODUODENAL (EGD) BIOPSY Background: 
 
Preoperative diagnosis: ESOPHAGITIS Postoperative diagnosis: gastric ulcer :  Dr. Schuyler Spatz Assistant(s): Endoscopy Technician-1: [facility] 
Endoscopy RN-1: Cristino Keenan RN Specimens:  
ID Type Source Tests Collected by Time Destination 1 : gastric ulcer bx Preservative   Claudius Lesches, MD 3/20/2019 2688 Pathology H. Pylori  no Assessment: 
Intra-procedure medications Anesthesia gave intra-procedure sedation and medications, see anesthesia flow sheet yes Intravenous fluids: NS@ [facility] Vital signs stable Abdominal assessment: round and soft Recommendation: 
Discharge patient per MD order. Family or Friend Permission to share finding with family or friend yes

## 2019-03-20 NOTE — DISCHARGE INSTRUCTIONS
[address] [facility] KIKI Vazquez MD  [phone]      March 20, 2019     Chela Ames  YOB: 1940    ENDOSCOPY DISCHARGE INSTRUCTIONS    If there is redness at IV site you should apply warm compress to area. If redness or soreness persist contact Dr. Ary Vazquez' or your primary care doctor. Gaseous discomfort may develop, but walking, belching will help relieve this. You may not operate a vehicle for 12 hours  You may not operate machinery or dangerous appliances for rest of today  You may not drink alcoholic beverages for 12 hours  Avoid making any critical decisions for 24 hours    DIET:  You may resume your normal diet, but some patients find that heavy or large meals may lead to indigestion or vomiting. I suggest a light meal as first food intake. MEDICATIONS:  The use of some over-the-counter pain medication may lead to bleeding after biopsies or other procedures you may have had done. Tylenol (also called acetaminophen) is safe to take and will not lead to bleeding. Based on the procedure you had today you may not safely take aspirin or aspirin-like products for the next ten (10) days. ACTIVITY:  You may resume your normal household activities, but it is recommended that you spend the remainder of the day resting -  avoid any strenuous activity. CALL DR. Natali Jeong' OFFICE IF:  Increasing pain, nausea, vomiting  Abdominal distension (swelling)  Significant new or increased bleeding (oral or rectal)  Fever/Chills  Chest pain/shortness of breath                   Additional instructions: The ulcer has still not healed. 1) Stop celebrex  2) Keep taking omeprazole (prilosec) take one pill in the morning before breakfast and another pill in the evening before your last meal of the day  3) Repeat endoscopy in 2months. It was an honor to be your doctor today.   Please let me or my office staff know if you have any feedback about today's procedure.     Rosmery Chen MD

## 2019-03-20 NOTE — ANESTHESIA POSTPROCEDURE EVALUATION
Procedure(s): ESOPHAGOGASTRODUODENOSCOPY (EGD) ESOPHAGOGASTRODUODENAL (EGD) BIOPSY. MAC Anesthesia Post Evaluation Multimodal analgesia: multimodal analgesia not used between 6 hours prior to anesthesia start to PACU discharge Patient location during evaluation: PACU Patient participation: complete - patient participated Level of consciousness: awake Pain management: adequate Airway patency: patent Anesthetic complications: no 
Cardiovascular status: acceptable, blood pressure returned to baseline and hemodynamically stable Respiratory status: acceptable Hydration status: acceptable Vitals Value Taken Time /100 3/20/2019  9:35 AM  
  3/20/2019  9:39 AM  
Pulse 49 3/20/2019  9:37 AM  
Resp 15 3/20/2019  9:37 AM  
SpO2 99 % 3/20/2019  9:37 AM  
Vitals shown include unvalidated device data.

## 2019-03-20 NOTE — ANESTHESIA PREPROCEDURE EVALUATION
Relevant Problems No relevant active problems Anesthetic History No history of anesthetic complications Review of Systems / Medical History Patient summary reviewed, nursing notes reviewed and pertinent labs reviewed Pulmonary Within defined limits Neuro/Psych Within defined limits Cardiovascular Within defined limits Hypertension Dysrhythmias : atrial fibrillation Exercise tolerance: >4 METS 
  
GI/Hepatic/Renal 
Within defined limits PUD Endo/Other Within defined limits Hypothyroidism Obesity and arthritis Other Findings Physical Exam 
 
Airway Mallampati: II 
 
Neck ROM: normal range of motion Mouth opening: Normal 
 
 Cardiovascular Regular rate and rhythm,  S1 and S2 normal,  no murmur, click, rub, or gallop Rhythm: regular Rate: normal 
 
 
 
 Dental 
No notable dental hx Pulmonary Breath sounds clear to auscultation Abdominal 
GI exam deferred Other Findings Anesthetic Plan ASA: 3 Anesthesia type: MAC Induction: Intravenous Anesthetic plan and risks discussed with: Patient

## 2019-03-20 NOTE — PROCEDURES
[address], [place]  [phone]      2019    Esophagogastroduodenoscopy (EGD) Procedure Note  Leslee Baca  : 1940  [employer] Medical Record Number: [id number]      Indications:    Gastric ulcer to assess for healing. Referring Physician:  Eleno Glover MD  Anesthesia/Sedation:  Conscious sedation/deep sedation/monitored anesthesia -- see notes. Endoscopist:  Dr. Lamberto Longoria  Complications:  None  Estimated Blood Loss:  None    Permit:  The indications, risks, benefits and alternatives were reviewed with the patient or their decision maker who was provided an opportunity to ask questions and all questions were answered. The specific risks of esophagogastroduodenoscopy with conscious sedation were reviewed, including but not limited to anesthetic complication, bleeding, adverse drug reaction, missed lesion, infection, IV site reactions, and intestinal perforation which would lead to the need for surgical repair. Alternatives to EGD including radiographic imaging, observation without testing, or laboratory testing were reviewed as well as the limitations of those alternatives discussed. After considering the options and having all their questions answered, the patient or their decision maker provided both verbal and written consent to proceed. Procedure in Detail:  After obtaining informed consent, positioning of the patient in the left lateral decubitus position, and conduction of a pre-procedure pause or \"time out\" the endoscope was introduced into the mouth and advanced to the duodenum. A careful inspection was made, and findings or interventions are described below. Findings:   Esophagus:normal  Stomach: Ongoing ulceration of the antrum of the stomach is noted. Cold forceps biopsies obtained from center and edge for histology.   Duodenum/jejunum: normal      Specimens: See above    Impression: Persistent gastric ulcer despite PPI and recommendations to avoid NSAIDs. She has been tested/stained for helicobacter and that test was negative. 2           Recommendations:  -Acid suppression with a proton pump inhibitor. , -No NSAIDS, -Will increase the dose of PPI and await biopsy results. Thank you for entrusting me with this patient's care. Please do not hesitate to contact me with any questions or if I can be of assistance with any of your other patients' GI needs. Signed By: Rosa Maria Baires MD                        March 20, 2019     Surgical assistant none. Implants none unless specified.

## 2019-03-20 NOTE — PERIOP NOTES
[facility]. Patient tolerated procedure well and has no c/o. Endoscope pre-cleaned by Woody Garnica at bedside.

## 2024-02-06 NOTE — ANESTHESIA PREPROCEDURE EVALUATION
Anesthetic History No history of anesthetic complications Review of Systems / Medical History Patient summary reviewed, nursing notes reviewed and pertinent labs reviewed Pulmonary Within defined limits Neuro/Psych Within defined limits Cardiovascular Within defined limits Hypertension Dysrhythmias : atrial fibrillation Exercise tolerance: >4 METS 
  
GI/Hepatic/Renal 
Within defined limits Endo/Other Within defined limits Hypothyroidism Arthritis Other Findings Comments: Hx esophageal abrasion Physical Exam 
 
Airway Mallampati: II 
 
Neck ROM: normal range of motion Mouth opening: Normal 
 
 Cardiovascular Regular rate and rhythm,  S1 and S2 normal,  no murmur, click, rub, or gallop Rhythm: regular Rate: normal 
 
 
 
 Dental 
No notable dental hx Pulmonary Breath sounds clear to auscultation Abdominal 
GI exam deferred Other Findings Anesthetic Plan ASA: 3 Anesthesia type: MAC Induction: Intravenous Anesthetic plan and risks discussed with: Patient I have personally reviewed the [facility] report for this patient.  This report is documented into the EMR.  I have considered the risks of abuse, dependence, addiction and diversion.

## (undated) DEVICE — SOLIDIFIER MEDC 1200ML -- CONVERT TO 356117

## (undated) DEVICE — 1200 GUARD II KIT W/5MM TUBE W/O VAC TUBE: Brand: GUARDIAN

## (undated) DEVICE — BASIN EMSIS 16OZ GRAPHITE PLAS KID SHP MOLD GRAD FOR ORAL

## (undated) DEVICE — KIT COLON W/ 1.1OZ LUB AND 2 END

## (undated) DEVICE — BAG BELONG PT PERS CLEAR HANDL

## (undated) DEVICE — NDL PRT INJ NSAF BLNT 18GX1.5 --

## (undated) DEVICE — FORCEPS BX L240CM JAW DIA2.8MM L CAP W/ NDL MIC MESH TOOTH

## (undated) DEVICE — CONTAINER SPEC 20 ML LID NEUT BUFF FORMALIN 10 % POLYPR STS

## (undated) DEVICE — CATH IV AUTOGRD BC PNK 20GA 25 -- INSYTE

## (undated) DEVICE — Device

## (undated) DEVICE — CATH IV AUTOGRD BC BLU 22GA 25 -- INSYTE

## (undated) DEVICE — BITEBLOCK ENDOSCP 60FR MAXI WHT POLYETH STURDY W/ VELC WVN

## (undated) DEVICE — SET GRAV CK VLV NEEDLESS ST 3 GANGED 4WAY STPCOCK HI FLO 10

## (undated) DEVICE — 3M™ CUROS™ DISINFECTING CAP FOR NEEDLELESS CONNECTORS 270/CARTON 20 CARTONS/CASE CFF1-270: Brand: CUROS™

## (undated) DEVICE — (D)SENSOR RMFG 02 PULS OXMTR -- DISC BY MFR USE ITEM 133445

## (undated) DEVICE — CANN NASAL O2 CAPNOGRAPHY AD -- FILTERLINE

## (undated) DEVICE — CUFF ADULT 1 PC 1 VINYL DISP --

## (undated) DEVICE — KENDALL RADIOLUCENT FOAM MONITORING ELECTRODE -RECTANGULAR SHAPE: Brand: KENDALL

## (undated) DEVICE — SYR 5ML 1/5 GRAD LL NSAF LF --

## (undated) DEVICE — BAG SPEC BIOHZRD 10 X 10 IN --

## (undated) DEVICE — ADULT SPO2 SENSOR: Brand: NELLCOR

## (undated) DEVICE — CANNULA CUSH AD W/ 14FT TBG

## (undated) DEVICE — SIMPLICITY FLUFF UNDERPAD 23X36, MODERATE: Brand: SIMPLICITY